# Patient Record
Sex: MALE | Race: WHITE | Employment: OTHER | RURAL
[De-identification: names, ages, dates, MRNs, and addresses within clinical notes are randomized per-mention and may not be internally consistent; named-entity substitution may affect disease eponyms.]

---

## 2017-01-04 ENCOUNTER — OFFICE VISIT (OUTPATIENT)
Dept: FAMILY MEDICINE CLINIC | Age: 67
End: 2017-01-04
Payer: MEDICARE

## 2017-01-04 VITALS — HEART RATE: 57 BPM | RESPIRATION RATE: 16 BRPM | SYSTOLIC BLOOD PRESSURE: 123 MMHG | DIASTOLIC BLOOD PRESSURE: 74 MMHG

## 2017-01-04 DIAGNOSIS — R21 RASH: ICD-10-CM

## 2017-01-04 DIAGNOSIS — K42.9 UMBILICAL HERNIA WITHOUT OBSTRUCTION AND WITHOUT GANGRENE: Primary | ICD-10-CM

## 2017-01-04 PROCEDURE — 99213 OFFICE O/P EST LOW 20 MIN: CPT | Performed by: FAMILY MEDICINE

## 2017-01-04 PROCEDURE — 96372 THER/PROPH/DIAG INJ SC/IM: CPT | Performed by: FAMILY MEDICINE

## 2017-01-04 RX ORDER — METHYLPREDNISOLONE ACETATE 80 MG/ML
120 INJECTION, SUSPENSION INTRA-ARTICULAR; INTRALESIONAL; INTRAMUSCULAR; SOFT TISSUE ONCE
Status: COMPLETED | OUTPATIENT
Start: 2017-01-04 | End: 2017-01-04

## 2017-01-04 RX ADMIN — METHYLPREDNISOLONE ACETATE 120 MG: 80 INJECTION, SUSPENSION INTRA-ARTICULAR; INTRALESIONAL; INTRAMUSCULAR; SOFT TISSUE at 15:38

## 2017-01-04 ASSESSMENT — ENCOUNTER SYMPTOMS
RESPIRATORY NEGATIVE: 1
EYES NEGATIVE: 1
GASTROINTESTINAL NEGATIVE: 1
ALLERGIC/IMMUNOLOGIC NEGATIVE: 1
TROUBLE SWALLOWING: 0

## 2017-01-04 ASSESSMENT — PATIENT HEALTH QUESTIONNAIRE - PHQ9
SUM OF ALL RESPONSES TO PHQ9 QUESTIONS 1 & 2: 0
SUM OF ALL RESPONSES TO PHQ QUESTIONS 1-9: 0
1. LITTLE INTEREST OR PLEASURE IN DOING THINGS: 0
2. FEELING DOWN, DEPRESSED OR HOPELESS: 0

## 2017-04-24 RX ORDER — METHOCARBAMOL 750 MG/1
750 TABLET, FILM COATED ORAL 4 TIMES DAILY PRN
COMMUNITY

## 2017-04-24 RX ORDER — ASPIRIN 81 MG/1
81 TABLET ORAL DAILY
COMMUNITY

## 2017-04-24 RX ORDER — TADALAFIL 10 MG/1
10 TABLET ORAL DAILY PRN
COMMUNITY

## 2017-04-24 RX ORDER — ATENOLOL 50 MG/1
50 TABLET ORAL DAILY
COMMUNITY

## 2017-04-24 RX ORDER — IBUPROFEN 400 MG/1
400 TABLET ORAL EVERY 6 HOURS PRN
COMMUNITY

## 2017-04-24 RX ORDER — HYDROCODONE BITARTRATE AND ACETAMINOPHEN 10; 325 MG/1; MG/1
1 TABLET ORAL EVERY 6 HOURS PRN
COMMUNITY

## 2017-05-08 ENCOUNTER — APPOINTMENT (OUTPATIENT)
Dept: PREADMISSION TESTING | Facility: HOSPITAL | Age: 67
End: 2017-05-08

## 2017-05-08 VITALS — HEIGHT: 76 IN | BODY MASS INDEX: 36.11 KG/M2 | WEIGHT: 296.52 LBS

## 2017-05-08 LAB
ANION GAP SERPL CALCULATED.3IONS-SCNC: 4 MMOL/L (ref 3–11)
BUN BLD-MCNC: 19 MG/DL (ref 9–23)
BUN/CREAT SERPL: 23.8 (ref 7–25)
CALCIUM SPEC-SCNC: 9.3 MG/DL (ref 8.7–10.4)
CHLORIDE SERPL-SCNC: 107 MMOL/L (ref 99–109)
CO2 SERPL-SCNC: 31 MMOL/L (ref 20–31)
CREAT BLD-MCNC: 0.8 MG/DL (ref 0.6–1.3)
DEPRECATED RDW RBC AUTO: 50 FL (ref 37–54)
ERYTHROCYTE [DISTWIDTH] IN BLOOD BY AUTOMATED COUNT: 14.2 % (ref 11.3–14.5)
GFR SERPL CREATININE-BSD FRML MDRD: 97 ML/MIN/1.73
GLUCOSE BLD-MCNC: 107 MG/DL (ref 70–100)
HCT VFR BLD AUTO: 47.2 % (ref 38.9–50.9)
HGB BLD-MCNC: 15 G/DL (ref 13.1–17.5)
MCH RBC QN AUTO: 30.2 PG (ref 27–31)
MCHC RBC AUTO-ENTMCNC: 31.8 G/DL (ref 32–36)
MCV RBC AUTO: 95 FL (ref 80–99)
PLATELET # BLD AUTO: 182 10*3/MM3 (ref 150–450)
PMV BLD AUTO: 9.7 FL (ref 6–12)
POTASSIUM BLD-SCNC: 4.2 MMOL/L (ref 3.5–5.5)
RBC # BLD AUTO: 4.97 10*6/MM3 (ref 4.2–5.76)
SODIUM BLD-SCNC: 142 MMOL/L (ref 132–146)
WBC NRBC COR # BLD: 5.31 10*3/MM3 (ref 3.5–10.8)

## 2017-05-08 PROCEDURE — 36415 COLL VENOUS BLD VENIPUNCTURE: CPT

## 2017-05-08 PROCEDURE — 85027 COMPLETE CBC AUTOMATED: CPT | Performed by: SURGERY

## 2017-05-08 PROCEDURE — 93005 ELECTROCARDIOGRAM TRACING: CPT

## 2017-05-08 PROCEDURE — 93010 ELECTROCARDIOGRAM REPORT: CPT | Performed by: INTERNAL MEDICINE

## 2017-05-08 PROCEDURE — 80048 BASIC METABOLIC PNL TOTAL CA: CPT | Performed by: SURGERY

## 2017-05-08 RX ORDER — LORATADINE 10 MG/1
10 CAPSULE, LIQUID FILLED ORAL DAILY
COMMUNITY

## 2017-05-08 RX ORDER — AMOXICILLIN 500 MG/1
1000 CAPSULE ORAL 3 TIMES DAILY
COMMUNITY
Start: 2017-05-04

## 2017-05-08 RX ORDER — TADALAFIL 10 MG/1
10 TABLET ORAL DAILY PRN
COMMUNITY

## 2017-05-08 RX ORDER — LISINOPRIL 20 MG/1
20 TABLET ORAL DAILY
COMMUNITY
End: 2021-05-07

## 2017-05-08 RX ORDER — ATENOLOL 50 MG/1
50 TABLET ORAL DAILY
COMMUNITY

## 2017-05-08 RX ORDER — ASPIRIN 81 MG/1
81 TABLET ORAL DAILY
COMMUNITY

## 2017-05-09 ENCOUNTER — ANESTHESIA EVENT (OUTPATIENT)
Dept: PERIOP | Facility: HOSPITAL | Age: 67
End: 2017-05-09

## 2017-05-09 RX ORDER — FAMOTIDINE 10 MG/ML
20 INJECTION, SOLUTION INTRAVENOUS ONCE
Status: CANCELLED | OUTPATIENT
Start: 2017-05-09 | End: 2017-05-09

## 2017-05-10 ENCOUNTER — HOSPITAL ENCOUNTER (OUTPATIENT)
Facility: HOSPITAL | Age: 67
Discharge: HOME | End: 2017-05-11
Attending: SURGERY | Admitting: SURGERY

## 2017-05-10 ENCOUNTER — ANESTHESIA (OUTPATIENT)
Dept: PERIOP | Facility: HOSPITAL | Age: 67
End: 2017-05-10

## 2017-05-10 PROBLEM — K40.90 INGUINAL HERNIA: Status: ACTIVE | Noted: 2017-05-10

## 2017-05-10 PROCEDURE — C1781 MESH (IMPLANTABLE): HCPCS | Performed by: SURGERY

## 2017-05-10 PROCEDURE — 25010000002 FENTANYL CITRATE (PF) 100 MCG/2ML SOLUTION: Performed by: NURSE ANESTHETIST, CERTIFIED REGISTERED

## 2017-05-10 PROCEDURE — 25010000003 CEFAZOLIN IN DEXTROSE 2-4 GM/100ML-% SOLUTION: Performed by: SURGERY

## 2017-05-10 PROCEDURE — 25010000002 BUPRENORPHINE PER 0.1 MG: Performed by: NURSE ANESTHETIST, CERTIFIED REGISTERED

## 2017-05-10 PROCEDURE — 25010000002 HYDROMORPHONE PER 4 MG: Performed by: NURSE ANESTHETIST, CERTIFIED REGISTERED

## 2017-05-10 PROCEDURE — G0378 HOSPITAL OBSERVATION PER HR: HCPCS

## 2017-05-10 PROCEDURE — 25010000002 PROPOFOL 10 MG/ML EMULSION: Performed by: NURSE ANESTHETIST, CERTIFIED REGISTERED

## 2017-05-10 PROCEDURE — 25010000002 MORPHINE SULFATE (PF) 2 MG/ML SOLUTION: Performed by: SURGERY

## 2017-05-10 PROCEDURE — 25010000002 DEXAMETHASONE SODIUM PHOSPHATE 10 MG/ML SOLUTION 1 ML VIAL: Performed by: NURSE ANESTHETIST, CERTIFIED REGISTERED

## 2017-05-10 PROCEDURE — 25010000002 NEOSTIGMINE 10 MG/10ML SOLUTION: Performed by: NURSE ANESTHETIST, CERTIFIED REGISTERED

## 2017-05-10 DEVICE — MESH PROLN 3X6: Type: IMPLANTABLE DEVICE | Site: GROIN | Status: FUNCTIONAL

## 2017-05-10 RX ORDER — SODIUM CHLORIDE 0.9 % (FLUSH) 0.9 %
1-10 SYRINGE (ML) INJECTION AS NEEDED
Status: DISCONTINUED | OUTPATIENT
Start: 2017-05-10 | End: 2017-05-10 | Stop reason: HOSPADM

## 2017-05-10 RX ORDER — CETIRIZINE HYDROCHLORIDE 10 MG/1
5 TABLET ORAL DAILY
Status: DISCONTINUED | OUTPATIENT
Start: 2017-05-10 | End: 2017-05-11 | Stop reason: HOSPADM

## 2017-05-10 RX ORDER — OXYCODONE HYDROCHLORIDE AND ACETAMINOPHEN 5; 325 MG/1; MG/1
1 TABLET ORAL ONCE AS NEEDED
Status: DISCONTINUED | OUTPATIENT
Start: 2017-05-10 | End: 2017-05-10 | Stop reason: HOSPADM

## 2017-05-10 RX ORDER — POLYETHYLENE GLYCOL 3350 17 G/17G
17 POWDER, FOR SOLUTION ORAL NIGHTLY
COMMUNITY

## 2017-05-10 RX ORDER — PROMETHAZINE HYDROCHLORIDE 25 MG/ML
12.5 INJECTION, SOLUTION INTRAMUSCULAR; INTRAVENOUS EVERY 6 HOURS PRN
Status: DISCONTINUED | OUTPATIENT
Start: 2017-05-10 | End: 2017-05-11 | Stop reason: HOSPADM

## 2017-05-10 RX ORDER — MULTIPLE VITAMINS W/ MINERALS TAB 9MG-400MCG
1 TAB ORAL DAILY
Status: DISCONTINUED | OUTPATIENT
Start: 2017-05-10 | End: 2017-05-11 | Stop reason: HOSPADM

## 2017-05-10 RX ORDER — ATRACURIUM BESYLATE 10 MG/ML
INJECTION, SOLUTION INTRAVENOUS AS NEEDED
Status: DISCONTINUED | OUTPATIENT
Start: 2017-05-10 | End: 2017-05-10 | Stop reason: SURG

## 2017-05-10 RX ORDER — FENTANYL CITRATE 50 UG/ML
50 INJECTION, SOLUTION INTRAMUSCULAR; INTRAVENOUS
Status: DISCONTINUED | OUTPATIENT
Start: 2017-05-10 | End: 2017-05-10 | Stop reason: HOSPADM

## 2017-05-10 RX ORDER — NEOSTIGMINE METHYLSULFATE 1 MG/ML
INJECTION, SOLUTION INTRAVENOUS AS NEEDED
Status: DISCONTINUED | OUTPATIENT
Start: 2017-05-10 | End: 2017-05-10 | Stop reason: SURG

## 2017-05-10 RX ORDER — IPRATROPIUM BROMIDE AND ALBUTEROL SULFATE 2.5; .5 MG/3ML; MG/3ML
3 SOLUTION RESPIRATORY (INHALATION) ONCE AS NEEDED
Status: DISCONTINUED | OUTPATIENT
Start: 2017-05-10 | End: 2017-05-10 | Stop reason: HOSPADM

## 2017-05-10 RX ORDER — NALOXONE HCL 0.4 MG/ML
0.4 VIAL (ML) INJECTION
Status: DISCONTINUED | OUTPATIENT
Start: 2017-05-10 | End: 2017-05-11 | Stop reason: HOSPADM

## 2017-05-10 RX ORDER — DOCUSATE SODIUM 100 MG/1
100 CAPSULE, LIQUID FILLED ORAL 2 TIMES DAILY PRN
Status: DISCONTINUED | OUTPATIENT
Start: 2017-05-10 | End: 2017-05-11 | Stop reason: HOSPADM

## 2017-05-10 RX ORDER — NALOXONE HCL 0.4 MG/ML
0.4 VIAL (ML) INJECTION AS NEEDED
Status: DISCONTINUED | OUTPATIENT
Start: 2017-05-10 | End: 2017-05-10 | Stop reason: HOSPADM

## 2017-05-10 RX ORDER — ACETAMINOPHEN 325 MG/1
650 TABLET ORAL EVERY 4 HOURS PRN
Status: DISCONTINUED | OUTPATIENT
Start: 2017-05-10 | End: 2017-05-11 | Stop reason: HOSPADM

## 2017-05-10 RX ORDER — ONDANSETRON 2 MG/ML
4 INJECTION INTRAMUSCULAR; INTRAVENOUS ONCE AS NEEDED
Status: DISCONTINUED | OUTPATIENT
Start: 2017-05-10 | End: 2017-05-10 | Stop reason: HOSPADM

## 2017-05-10 RX ORDER — HYDRALAZINE HYDROCHLORIDE 20 MG/ML
5 INJECTION INTRAMUSCULAR; INTRAVENOUS
Status: DISCONTINUED | OUTPATIENT
Start: 2017-05-10 | End: 2017-05-10 | Stop reason: HOSPADM

## 2017-05-10 RX ORDER — ACETAMINOPHEN 160 MG/5ML
650 SOLUTION ORAL EVERY 4 HOURS PRN
Status: DISCONTINUED | OUTPATIENT
Start: 2017-05-10 | End: 2017-05-11 | Stop reason: HOSPADM

## 2017-05-10 RX ORDER — PROMETHAZINE HYDROCHLORIDE 25 MG/1
25 TABLET ORAL ONCE AS NEEDED
Status: DISCONTINUED | OUTPATIENT
Start: 2017-05-10 | End: 2017-05-10 | Stop reason: HOSPADM

## 2017-05-10 RX ORDER — CEFAZOLIN SODIUM 2 G/100ML
2 INJECTION, SOLUTION INTRAVENOUS ONCE
Status: COMPLETED | OUTPATIENT
Start: 2017-05-10 | End: 2017-05-10

## 2017-05-10 RX ORDER — LIDOCAINE HYDROCHLORIDE 10 MG/ML
0.5 INJECTION, SOLUTION EPIDURAL; INFILTRATION; INTRACAUDAL; PERINEURAL ONCE AS NEEDED
Status: COMPLETED | OUTPATIENT
Start: 2017-05-10 | End: 2017-05-10

## 2017-05-10 RX ORDER — OXYCODONE AND ACETAMINOPHEN 7.5; 325 MG/1; MG/1
1 TABLET ORAL ONCE AS NEEDED
Status: DISCONTINUED | OUTPATIENT
Start: 2017-05-10 | End: 2017-05-10 | Stop reason: HOSPADM

## 2017-05-10 RX ORDER — OXYCODONE AND ACETAMINOPHEN 10; 325 MG/1; MG/1
1 TABLET ORAL EVERY 4 HOURS PRN
Status: DISCONTINUED | OUTPATIENT
Start: 2017-05-10 | End: 2017-05-11 | Stop reason: HOSPADM

## 2017-05-10 RX ORDER — NAPROXEN SODIUM 220 MG
220 TABLET ORAL AS NEEDED
COMMUNITY

## 2017-05-10 RX ORDER — PROMETHAZINE HYDROCHLORIDE 25 MG/1
25 SUPPOSITORY RECTAL ONCE AS NEEDED
Status: DISCONTINUED | OUTPATIENT
Start: 2017-05-10 | End: 2017-05-10 | Stop reason: HOSPADM

## 2017-05-10 RX ORDER — ATENOLOL 50 MG/1
50 TABLET ORAL DAILY
Status: DISCONTINUED | OUTPATIENT
Start: 2017-05-10 | End: 2017-05-11 | Stop reason: HOSPADM

## 2017-05-10 RX ORDER — ONDANSETRON 2 MG/ML
4 INJECTION INTRAMUSCULAR; INTRAVENOUS EVERY 6 HOURS PRN
Status: DISCONTINUED | OUTPATIENT
Start: 2017-05-10 | End: 2017-05-11 | Stop reason: HOSPADM

## 2017-05-10 RX ORDER — CEFAZOLIN SODIUM 2 G/100ML
2 INJECTION, SOLUTION INTRAVENOUS EVERY 8 HOURS
Status: DISCONTINUED | OUTPATIENT
Start: 2017-05-10 | End: 2017-05-10 | Stop reason: SDUPTHER

## 2017-05-10 RX ORDER — PROMETHAZINE HYDROCHLORIDE 25 MG/ML
6.25 INJECTION, SOLUTION INTRAMUSCULAR; INTRAVENOUS ONCE AS NEEDED
Status: DISCONTINUED | OUTPATIENT
Start: 2017-05-10 | End: 2017-05-10 | Stop reason: HOSPADM

## 2017-05-10 RX ORDER — PROMETHAZINE HYDROCHLORIDE 25 MG/ML
12.5 INJECTION, SOLUTION INTRAMUSCULAR; INTRAVENOUS EVERY 4 HOURS PRN
Status: DISCONTINUED | OUTPATIENT
Start: 2017-05-10 | End: 2017-05-11 | Stop reason: HOSPADM

## 2017-05-10 RX ORDER — LISINOPRIL 20 MG/1
20 TABLET ORAL DAILY
Status: DISCONTINUED | OUTPATIENT
Start: 2017-05-11 | End: 2017-05-11 | Stop reason: HOSPADM

## 2017-05-10 RX ORDER — FAMOTIDINE 20 MG/1
20 TABLET, FILM COATED ORAL ONCE
Status: COMPLETED | OUTPATIENT
Start: 2017-05-10 | End: 2017-05-10

## 2017-05-10 RX ORDER — PROPOFOL 10 MG/ML
VIAL (ML) INTRAVENOUS AS NEEDED
Status: DISCONTINUED | OUTPATIENT
Start: 2017-05-10 | End: 2017-05-10 | Stop reason: SURG

## 2017-05-10 RX ORDER — POLYETHYLENE GLYCOL 3350 17 G/17G
17 POWDER, FOR SOLUTION ORAL NIGHTLY
Status: DISCONTINUED | OUTPATIENT
Start: 2017-05-10 | End: 2017-05-11 | Stop reason: HOSPADM

## 2017-05-10 RX ORDER — SODIUM CHLORIDE, SODIUM LACTATE, POTASSIUM CHLORIDE, CALCIUM CHLORIDE 600; 310; 30; 20 MG/100ML; MG/100ML; MG/100ML; MG/100ML
9 INJECTION, SOLUTION INTRAVENOUS CONTINUOUS
Status: DISCONTINUED | OUTPATIENT
Start: 2017-05-10 | End: 2017-05-11 | Stop reason: HOSPADM

## 2017-05-10 RX ORDER — LIDOCAINE HYDROCHLORIDE 10 MG/ML
INJECTION, SOLUTION EPIDURAL; INFILTRATION; INTRACAUDAL; PERINEURAL AS NEEDED
Status: DISCONTINUED | OUTPATIENT
Start: 2017-05-10 | End: 2017-05-10 | Stop reason: SURG

## 2017-05-10 RX ORDER — LABETALOL HYDROCHLORIDE 5 MG/ML
5 INJECTION, SOLUTION INTRAVENOUS
Status: DISCONTINUED | OUTPATIENT
Start: 2017-05-10 | End: 2017-05-10 | Stop reason: HOSPADM

## 2017-05-10 RX ORDER — ASPIRIN 81 MG/1
81 TABLET ORAL DAILY
Status: DISCONTINUED | OUTPATIENT
Start: 2017-05-10 | End: 2017-05-11 | Stop reason: HOSPADM

## 2017-05-10 RX ORDER — ONDANSETRON 4 MG/1
4 TABLET, FILM COATED ORAL EVERY 6 HOURS PRN
Status: DISCONTINUED | OUTPATIENT
Start: 2017-05-10 | End: 2017-05-11 | Stop reason: HOSPADM

## 2017-05-10 RX ORDER — CEFAZOLIN SODIUM IN 0.9 % NACL 3 G/100 ML
3 INTRAVENOUS SOLUTION, PIGGYBACK (ML) INTRAVENOUS EVERY 8 HOURS
Status: COMPLETED | OUTPATIENT
Start: 2017-05-10 | End: 2017-05-11

## 2017-05-10 RX ORDER — SODIUM CHLORIDE, SODIUM LACTATE, POTASSIUM CHLORIDE, CALCIUM CHLORIDE 600; 310; 30; 20 MG/100ML; MG/100ML; MG/100ML; MG/100ML
50 INJECTION, SOLUTION INTRAVENOUS CONTINUOUS
Status: DISCONTINUED | OUTPATIENT
Start: 2017-05-10 | End: 2017-05-11 | Stop reason: HOSPADM

## 2017-05-10 RX ORDER — MORPHINE SULFATE 2 MG/ML
6 INJECTION, SOLUTION INTRAMUSCULAR; INTRAVENOUS
Status: DISCONTINUED | OUTPATIENT
Start: 2017-05-10 | End: 2017-05-11 | Stop reason: HOSPADM

## 2017-05-10 RX ORDER — HYDROMORPHONE HYDROCHLORIDE 1 MG/ML
0.5 INJECTION, SOLUTION INTRAMUSCULAR; INTRAVENOUS; SUBCUTANEOUS
Status: DISCONTINUED | OUTPATIENT
Start: 2017-05-10 | End: 2017-05-10 | Stop reason: HOSPADM

## 2017-05-10 RX ORDER — GLYCOPYRROLATE 0.2 MG/ML
INJECTION INTRAMUSCULAR; INTRAVENOUS AS NEEDED
Status: DISCONTINUED | OUTPATIENT
Start: 2017-05-10 | End: 2017-05-10 | Stop reason: SURG

## 2017-05-10 RX ORDER — ACETAMINOPHEN 650 MG/1
650 SUPPOSITORY RECTAL EVERY 4 HOURS PRN
Status: DISCONTINUED | OUTPATIENT
Start: 2017-05-10 | End: 2017-05-11 | Stop reason: HOSPADM

## 2017-05-10 RX ADMIN — LIDOCAINE HYDROCHLORIDE 0.5 ML: 10 INJECTION, SOLUTION EPIDURAL; INFILTRATION; INTRACAUDAL; PERINEURAL at 07:10

## 2017-05-10 RX ADMIN — SODIUM CHLORIDE, POTASSIUM CHLORIDE, SODIUM LACTATE AND CALCIUM CHLORIDE: 600; 310; 30; 20 INJECTION, SOLUTION INTRAVENOUS at 10:33

## 2017-05-10 RX ADMIN — FAMOTIDINE 20 MG: 20 TABLET, FILM COATED ORAL at 07:17

## 2017-05-10 RX ADMIN — HYDROMORPHONE HYDROCHLORIDE 0.5 MG: 1 INJECTION, SOLUTION INTRAMUSCULAR; INTRAVENOUS; SUBCUTANEOUS at 12:05

## 2017-05-10 RX ADMIN — HYDROMORPHONE HYDROCHLORIDE 0.5 MG: 1 INJECTION, SOLUTION INTRAMUSCULAR; INTRAVENOUS; SUBCUTANEOUS at 11:25

## 2017-05-10 RX ADMIN — ROBINUL 0.4 MG: 0.2 INJECTION INTRAMUSCULAR; INTRAVENOUS at 10:27

## 2017-05-10 RX ADMIN — SODIUM CHLORIDE, POTASSIUM CHLORIDE, SODIUM LACTATE AND CALCIUM CHLORIDE 50 ML/HR: 600; 310; 30; 20 INJECTION, SOLUTION INTRAVENOUS at 13:09

## 2017-05-10 RX ADMIN — OXYCODONE HYDROCHLORIDE AND ACETAMINOPHEN 1 TABLET: 10; 325 TABLET ORAL at 16:47

## 2017-05-10 RX ADMIN — MORPHINE SULFATE 2 MG: 2 INJECTION, SOLUTION INTRAMUSCULAR; INTRAVENOUS at 13:53

## 2017-05-10 RX ADMIN — LIDOCAINE HYDROCHLORIDE 50 MG: 10 INJECTION, SOLUTION EPIDURAL; INFILTRATION; INTRACAUDAL; PERINEURAL at 08:02

## 2017-05-10 RX ADMIN — CETIRIZINE HYDROCHLORIDE 5 MG: 10 TABLET, FILM COATED ORAL at 21:15

## 2017-05-10 RX ADMIN — ATRACURIUM BESYLATE 50 MG: 10 INJECTION, SOLUTION INTRAVENOUS at 08:02

## 2017-05-10 RX ADMIN — CEFAZOLIN SODIUM 2 G: 2 INJECTION, SOLUTION INTRAVENOUS at 08:05

## 2017-05-10 RX ADMIN — DEXAMETHASONE SODIUM PHOSPHATE: 10 INJECTION, SOLUTION INTRAMUSCULAR; INTRAVENOUS at 08:04

## 2017-05-10 RX ADMIN — MORPHINE SULFATE 4 MG: 2 INJECTION, SOLUTION INTRAMUSCULAR; INTRAVENOUS at 12:59

## 2017-05-10 RX ADMIN — FENTANYL CITRATE 50 MCG: 50 INJECTION INTRAMUSCULAR; INTRAVENOUS at 11:00

## 2017-05-10 RX ADMIN — OXYCODONE HYDROCHLORIDE AND ACETAMINOPHEN 1 TABLET: 10; 325 TABLET ORAL at 21:16

## 2017-05-10 RX ADMIN — FENTANYL CITRATE 50 MCG: 50 INJECTION INTRAMUSCULAR; INTRAVENOUS at 11:10

## 2017-05-10 RX ADMIN — PROPOFOL 200 MG: 10 INJECTION, EMULSION INTRAVENOUS at 08:02

## 2017-05-10 RX ADMIN — POLYETHYLENE GLYCOL 3350 17 G: 17 POWDER, FOR SOLUTION ORAL at 21:17

## 2017-05-10 RX ADMIN — OXYCODONE HYDROCHLORIDE AND ACETAMINOPHEN 1 TABLET: 10; 325 TABLET ORAL at 13:00

## 2017-05-10 RX ADMIN — NEOSTIGMINE METHYLSULFATE 2.5 MG: 1 INJECTION, SOLUTION INTRAVENOUS at 10:27

## 2017-05-10 RX ADMIN — HYDROMORPHONE HYDROCHLORIDE 0.5 MG: 1 INJECTION, SOLUTION INTRAMUSCULAR; INTRAVENOUS; SUBCUTANEOUS at 11:40

## 2017-05-10 RX ADMIN — Medication 3 G: at 16:45

## 2017-05-10 RX ADMIN — SODIUM CHLORIDE, POTASSIUM CHLORIDE, SODIUM LACTATE AND CALCIUM CHLORIDE 9 ML/HR: 600; 310; 30; 20 INJECTION, SOLUTION INTRAVENOUS at 07:10

## 2017-05-11 VITALS
OXYGEN SATURATION: 97 % | HEIGHT: 76 IN | DIASTOLIC BLOOD PRESSURE: 64 MMHG | SYSTOLIC BLOOD PRESSURE: 111 MMHG | WEIGHT: 296 LBS | HEART RATE: 50 BPM | TEMPERATURE: 97.6 F | RESPIRATION RATE: 18 BRPM | BODY MASS INDEX: 36.04 KG/M2

## 2017-05-11 PROCEDURE — G0378 HOSPITAL OBSERVATION PER HR: HCPCS

## 2017-05-11 RX ORDER — OXYCODONE AND ACETAMINOPHEN 10; 325 MG/1; MG/1
1 TABLET ORAL EVERY 4 HOURS PRN
Qty: 40 TABLET | Refills: 0 | Status: SHIPPED | OUTPATIENT
Start: 2017-05-11 | End: 2017-05-20

## 2017-05-11 RX ADMIN — MULTIPLE VITAMINS W/ MINERALS TAB 1 TABLET: TAB ORAL at 08:16

## 2017-05-11 RX ADMIN — Medication 3 G: at 00:33

## 2017-05-11 RX ADMIN — CETIRIZINE HYDROCHLORIDE 5 MG: 10 TABLET, FILM COATED ORAL at 08:17

## 2017-05-11 RX ADMIN — OXYCODONE HYDROCHLORIDE AND ACETAMINOPHEN 1 TABLET: 10; 325 TABLET ORAL at 01:03

## 2017-05-11 RX ADMIN — ASPIRIN 81 MG: 81 TABLET, COATED ORAL at 08:16

## 2017-05-11 RX ADMIN — OXYCODONE HYDROCHLORIDE AND ACETAMINOPHEN 1 TABLET: 10; 325 TABLET ORAL at 08:30

## 2017-05-11 RX ADMIN — HYDROCORTISONE 2.5% 1 APPLICATION: 25 CREAM TOPICAL at 08:15

## 2017-05-11 RX ADMIN — LISINOPRIL 20 MG: 20 TABLET ORAL at 08:20

## 2017-05-22 ENCOUNTER — OFFICE VISIT (OUTPATIENT)
Dept: FAMILY MEDICINE CLINIC | Age: 67
End: 2017-05-22
Payer: MEDICARE

## 2017-05-22 VITALS
HEART RATE: 57 BPM | RESPIRATION RATE: 18 BRPM | SYSTOLIC BLOOD PRESSURE: 142 MMHG | OXYGEN SATURATION: 96 % | DIASTOLIC BLOOD PRESSURE: 89 MMHG

## 2017-05-22 DIAGNOSIS — Z98.890 S/P HERNIA REPAIR: Primary | ICD-10-CM

## 2017-05-22 DIAGNOSIS — Z87.19 S/P HERNIA REPAIR: Primary | ICD-10-CM

## 2017-05-22 PROCEDURE — 1036F TOBACCO NON-USER: CPT | Performed by: NURSE PRACTITIONER

## 2017-05-22 PROCEDURE — 99213 OFFICE O/P EST LOW 20 MIN: CPT | Performed by: NURSE PRACTITIONER

## 2017-05-22 PROCEDURE — G8421 BMI NOT CALCULATED: HCPCS | Performed by: NURSE PRACTITIONER

## 2017-05-22 PROCEDURE — 3017F COLORECTAL CA SCREEN DOC REV: CPT | Performed by: NURSE PRACTITIONER

## 2017-05-22 PROCEDURE — 1123F ACP DISCUSS/DSCN MKR DOCD: CPT | Performed by: NURSE PRACTITIONER

## 2017-05-22 PROCEDURE — G8427 DOCREV CUR MEDS BY ELIG CLIN: HCPCS | Performed by: NURSE PRACTITIONER

## 2017-05-22 PROCEDURE — 4040F PNEUMOC VAC/ADMIN/RCVD: CPT | Performed by: NURSE PRACTITIONER

## 2017-05-22 ASSESSMENT — ENCOUNTER SYMPTOMS
CONSTIPATION: 0
EYES NEGATIVE: 1
ALLERGIC/IMMUNOLOGIC NEGATIVE: 1
DIARRHEA: 0
NAUSEA: 0
ABDOMINAL PAIN: 0
VOMITING: 0
RESPIRATORY NEGATIVE: 1

## 2018-08-20 ENCOUNTER — ANESTHESIA EVENT (OUTPATIENT)
Dept: ENDOSCOPY | Facility: HOSPITAL | Age: 68
End: 2018-08-20
Payer: MEDICARE

## 2018-08-20 ENCOUNTER — HOSPITAL ENCOUNTER (OUTPATIENT)
Facility: HOSPITAL | Age: 68
Setting detail: OUTPATIENT SURGERY
Discharge: HOME | End: 2018-08-20
Attending: SURGERY | Admitting: SURGERY
Payer: MEDICARE

## 2018-08-20 ENCOUNTER — ANESTHESIA (OUTPATIENT)
Dept: ENDOSCOPY | Facility: HOSPITAL | Age: 68
End: 2018-08-20
Payer: MEDICARE

## 2018-08-20 VITALS
WEIGHT: 288 LBS | HEART RATE: 48 BPM | OXYGEN SATURATION: 95 % | DIASTOLIC BLOOD PRESSURE: 72 MMHG | HEIGHT: 76 IN | TEMPERATURE: 97.7 F | BODY MASS INDEX: 35.07 KG/M2 | SYSTOLIC BLOOD PRESSURE: 119 MMHG | RESPIRATION RATE: 22 BRPM

## 2018-08-20 VITALS
SYSTOLIC BLOOD PRESSURE: 110 MMHG | RESPIRATION RATE: 29 BRPM | DIASTOLIC BLOOD PRESSURE: 78 MMHG | OXYGEN SATURATION: 93 %

## 2018-08-20 PROCEDURE — 3700000001 HC ADD 15 MINUTES (ANESTHESIA): Performed by: SURGERY

## 2018-08-20 PROCEDURE — 2580000003 HC RX 258: Performed by: SURGERY

## 2018-08-20 PROCEDURE — G0121 COLON CA SCRN NOT HI RSK IND: HCPCS | Performed by: SURGERY

## 2018-08-20 PROCEDURE — 3609009500 HC COLONOSCOPY DIAGNOSTIC OR SCREENING: Performed by: SURGERY

## 2018-08-20 PROCEDURE — 7100000011 HC PHASE II RECOVERY - ADDTL 15 MIN: Performed by: SURGERY

## 2018-08-20 PROCEDURE — 6360000002 HC RX W HCPCS: Performed by: NURSE ANESTHETIST, CERTIFIED REGISTERED

## 2018-08-20 PROCEDURE — 3700000000 HC ANESTHESIA ATTENDED CARE: Performed by: SURGERY

## 2018-08-20 PROCEDURE — 7100000010 HC PHASE II RECOVERY - FIRST 15 MIN: Performed by: SURGERY

## 2018-08-20 RX ORDER — VARDENAFIL HYDROCHLORIDE 10 MG/1
10 TABLET ORAL DAILY
COMMUNITY

## 2018-08-20 RX ORDER — LOSARTAN POTASSIUM 50 MG/1
50 TABLET ORAL DAILY
COMMUNITY

## 2018-08-20 RX ORDER — SODIUM CHLORIDE 0.9 % (FLUSH) 0.9 %
10 SYRINGE (ML) INJECTION EVERY 12 HOURS SCHEDULED
Status: DISCONTINUED | OUTPATIENT
Start: 2018-08-20 | End: 2018-08-20 | Stop reason: HOSPADM

## 2018-08-20 RX ORDER — SODIUM CHLORIDE, SODIUM LACTATE, POTASSIUM CHLORIDE, CALCIUM CHLORIDE 600; 310; 30; 20 MG/100ML; MG/100ML; MG/100ML; MG/100ML
INJECTION, SOLUTION INTRAVENOUS CONTINUOUS
Status: DISCONTINUED | OUTPATIENT
Start: 2018-08-20 | End: 2018-08-20 | Stop reason: HOSPADM

## 2018-08-20 RX ORDER — M-VIT,TX,IRON,MINS/CALC/FOLIC 27MG-0.4MG
1 TABLET ORAL DAILY
COMMUNITY

## 2018-08-20 RX ORDER — PROPOFOL 10 MG/ML
INJECTION, EMULSION INTRAVENOUS PRN
Status: DISCONTINUED | OUTPATIENT
Start: 2018-08-20 | End: 2018-08-20 | Stop reason: SDUPTHER

## 2018-08-20 RX ORDER — POLYETHYLENE GLYCOL 3350 17 G/17G
17 POWDER, FOR SOLUTION ORAL DAILY
COMMUNITY

## 2018-08-20 RX ORDER — SODIUM CHLORIDE 0.9 % (FLUSH) 0.9 %
10 SYRINGE (ML) INJECTION PRN
Status: DISCONTINUED | OUTPATIENT
Start: 2018-08-20 | End: 2018-08-20 | Stop reason: HOSPADM

## 2018-08-20 RX ADMIN — Medication 10 ML: at 11:15

## 2018-08-20 RX ADMIN — PROPOFOL 90 MG: 10 INJECTION, EMULSION INTRAVENOUS at 12:33

## 2018-08-20 RX ADMIN — PROPOFOL 40 MG: 10 INJECTION, EMULSION INTRAVENOUS at 12:48

## 2018-08-20 RX ADMIN — PROPOFOL 80 MG: 10 INJECTION, EMULSION INTRAVENOUS at 12:52

## 2018-08-20 RX ADMIN — PROPOFOL 70 MG: 10 INJECTION, EMULSION INTRAVENOUS at 12:39

## 2018-08-20 RX ADMIN — PROPOFOL 30 MG: 10 INJECTION, EMULSION INTRAVENOUS at 12:35

## 2018-08-20 RX ADMIN — SODIUM CHLORIDE, POTASSIUM CHLORIDE, SODIUM LACTATE AND CALCIUM CHLORIDE: 600; 310; 30; 20 INJECTION, SOLUTION INTRAVENOUS at 11:14

## 2018-08-20 RX ADMIN — PROPOFOL 50 MG: 10 INJECTION, EMULSION INTRAVENOUS at 12:42

## 2018-08-20 ASSESSMENT — ENCOUNTER SYMPTOMS
RESPIRATORY NEGATIVE: 1
EYES NEGATIVE: 1
GASTROINTESTINAL NEGATIVE: 1

## 2018-08-20 NOTE — ANESTHESIA POSTPROCEDURE EVALUATION
Department of Anesthesiology  Postprocedure Note    Patient: Madeline Yusuf  MRN: [id number]  YOB: 1950  Date of evaluation: 8/20/2018  Time:  1:03 PM     Procedure Summary     Date:  08/20/18 Room / Location:  [place] [address] / [facility] ENDOSCOPY    Anesthesia Start:  2050 Anesthesia Stop:  9788    Procedure:  COLONOSCOPY DIAGNOSTIC OR SCREENING (N/A ) Diagnosis:  (Z12.11)    Surgeon:  Ever Stephen MD Responsible Provider:  SOFI Gallego CRNA    Anesthesia Type:  MAC ASA Status:  3          Anesthesia Type: MAC    Imani Phase I: Imani Score: 10    Imani Phase II: Imani Score: 10    Last vitals: Reviewed and per EMR flowsheets.        Anesthesia Post Evaluation    Patient location during evaluation: bedside  Patient participation: complete - patient participated  Level of consciousness: awake and alert  Airway patency: patent  Nausea & Vomiting: no nausea and no vomiting  Complications: no  Cardiovascular status: blood pressure returned to baseline  Respiratory status: acceptable  Hydration status: stable

## 2018-08-20 NOTE — ANESTHESIA PRE PROCEDURE
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    Left shoulder pain M25.512    Right leg pain M79.604    Hernia K46.9    Hemorrhoids K64.9    Pre-op evaluation Z01.818    Neck pain M54.2    Rash R21       Past Medical History:        Diagnosis Date    Asthma     Hypertension     Seasonal allergies        Past Surgical History:        Procedure Laterality Date    BACK SURGERY      HERNIA REPAIR      SHOULDER SURGERY      TONSILLECTOMY AND ADENOIDECTOMY      TOTAL KNEE ARTHROPLASTY Bilateral     TUMOR REMOVAL      WRIST SURGERY         Social History:    Social History   Substance Use Topics    Smoking status: Never Smoker    Smokeless tobacco: Never Used    Alcohol use No                                Counseling given: Not Answered      Vital Signs (Current):   Vitals:    08/20/18 0936 08/20/18 1114   BP: 131/76    Pulse: 55    Resp: 22    Temp: 98.1 °F (36.7 °C)    TempSrc: Oral    SpO2: 94%    Weight:  288 lb (130.6 kg)   Height:  6' 4\" (1.93 m)                                              BP Readings from Last 3 Encounters:   08/20/18 131/76   05/22/17 (!) 142/89   01/04/17 123/74       NPO Status: Time of last liquid consumption: 2330                        Time of last solid consumption: 2000                        Date of last liquid consumption: 08/19/18                        Date of last solid food consumption: 08/18/18    BMI:   Wt Readings from Last 3 Encounters:   08/20/18 288 lb (130.6 kg)   09/22/16 280 lb (127 kg)   05/10/16 271 lb (122.9 kg)     Body mass index is 35.06 kg/m².     CBC:   Lab Results   Component Value Date    WBC 5.0 09/22/2016    RBC 5.22 09/22/2016    HGB 15.1 09/22/2016    HCT 47.6 09/22/2016    MCV 91.2 09/22/2016 RDW 15.1 09/22/2016     09/22/2016       CMP:   Lab Results   Component Value Date     09/22/2016    K 5.3 09/22/2016     09/22/2016    CO2 29 09/22/2016    BUN 20 09/22/2016    CREATININE 1.1 09/22/2016    GFRAA >59 09/22/2016    AGRATIO 1.7 09/22/2016    LABGLOM >59 09/22/2016    GLUCOSE 108 09/22/2016    PROT 6.7 09/22/2016    CALCIUM 9.2 09/22/2016    BILITOT 0.5 09/22/2016    ALKPHOS 84 09/22/2016    AST 30 09/22/2016    ALT 23 09/22/2016       POC Tests: No results for input(s): POCGLU, POCNA, POCK, POCCL, POCBUN, POCHEMO, POCHCT in the last 72 hours.     Coags:   Lab Results   Component Value Date    PROTIME 10.3 08/18/2015    INR 1.00 08/18/2015    APTT 25.0 08/18/2015       HCG (If Applicable): No results found for: PREGTESTUR, PREGSERUM, HCG, HCGQUANT     ABGs: No results found for: PHART, PO2ART, PGE9EQX, AEA7HVL, BEART, Z8YDWGFZ     Type & Screen (If Applicable):  No results found for: Gracie Nino [address]    Anesthesia Evaluation     Anesthesia Plan        Kayla Altamirano APRN - CRNA   8/20/2018

## 2018-08-20 NOTE — PROGRESS NOTES
Pt awake and talking with wife. No acute distress noted. Pt tolerating po fluids well. Bowel sounds (+) x 4 quads. ABD soft and non-distended.

## 2018-08-20 NOTE — ANESTHESIA PRE PROCEDURE
RDW 15.1 09/22/2016     09/22/2016       CMP:   Lab Results   Component Value Date     09/22/2016    K 5.3 09/22/2016     09/22/2016    CO2 29 09/22/2016    BUN 20 09/22/2016    CREATININE 1.1 09/22/2016    GFRAA >59 09/22/2016    AGRATIO 1.7 09/22/2016    LABGLOM >59 09/22/2016    GLUCOSE 108 09/22/2016    PROT 6.7 09/22/2016    CALCIUM 9.2 09/22/2016    BILITOT 0.5 09/22/2016    ALKPHOS 84 09/22/2016    AST 30 09/22/2016    ALT 23 09/22/2016       POC Tests: No results for input(s): POCGLU, POCNA, POCK, POCCL, POCBUN, POCHEMO, POCHCT in the last 72 hours. Coags:   Lab Results   Component Value Date    PROTIME 10.3 08/18/2015    INR 1.00 08/18/2015    APTT 25.0 08/18/2015       HCG (If Applicable): No results found for: PREGTESTUR, PREGSERUM, HCG, HCGQUANT     ABGs: No results found for: PHART, PO2ART, ATT4JNU, ZGC8YES, BEART, P4VHPNWT     Type & Screen (If Applicable):  No results found for: LABABO, LABRH    Anesthesia Evaluation    Airway: Mallampati: II        Dental:          Pulmonary:normal exam  breath sounds clear to auscultation  (+) asthma: exercise-induced asthma,                            Cardiovascular:  Exercise tolerance: good (>4 METS),   (+) hypertension: moderate, hyperlipidemia        Rhythm: regular  Rate: normal                    Neuro/Psych:   Negative Neuro/Psych ROS              GI/Hepatic/Renal: Neg GI/Hepatic/Renal ROS            Endo/Other: Negative Endo/Other ROS                    Abdominal:           Vascular: negative vascular ROS. Anesthesia Plan      MAC     ASA 3       Induction: intravenous. Anesthetic plan and risks discussed with patient. Use of blood products discussed with patient whom.                    SOFI Ramos - CRNA   8/20/2018

## 2018-08-20 NOTE — H&P
Consult Note    Patient:   Denver Massing   YOB: 1950   Facility:   [place] [address]   Referring/PCP: SOFI Bourgeois NP  Date:     8/20/2018  Consultant:   Alejo Philippe MD  The patient is referred by SOFI Bourgeois NP to have colon screening. Subjective: This 79 y.o. male was seen  for colon screening. Patient has Last colonoscopy was over 8 years ago  Information was obtained from interview of  the patient, examination of the patient, and review of records. I did  update the past medical, surgical, social and / or family history. Patient has had no symptoms. Medications:     Prior to Admission medications    Medication Sig Start Date End Date Taking? Authorizing Provider   hydrocortisone (ANUSOL-HC) 2.5 % rectal cream Place rectally 2 times daily. 3/3/16   SOFI Bourgeois - NP   lisinopril (PRINIVIL;ZESTRIL) 20 MG tablet Take 1 tablet by mouth daily. 9/18/14   SOFI Bourgeois - NP   atenolol (TENORMIN) 50 MG tablet Take 1 tablet by mouth daily. 9/18/14   Rossy Pedraza APRROBSON - LORETTA   traMADol (ULTRAM) 50 MG tablet Take 1 tablet by mouth every 6 hours. 9/18/14   Rossy Pedraza APRROBSON - NP   cephALEXin (KEFLEX) 500 MG capsule Take 1 capsule by mouth 4 times daily. 9/18/14   Rossy Pedraza APRROBSON - NP   loratadine (CLARITIN) 10 MG tablet Take 1 tablet by mouth daily. 9/18/14   Rossy Pedraza APRROBSON - LORETTA   HYDROcodone-acetaminophen (LORTAB) 7.5-500 MG per tablet Take 1 tablet by mouth every 6 hours as needed. Historical Provider, MD   ibuprofen (ADVIL;MOTRIN) 200 MG tablet Take 200 mg by mouth every 6 hours as needed.       Historical Provider, MD        Allergies: No Known Allergies      History:     Past Medical History:   Diagnosis Date    Hypertension      Past Surgical History:   Procedure Laterality Date    BACK SURGERY      HERNIA REPAIR      SHOULDER SURGERY      TONSILLECTOMY AND ADENOIDECTOMY      TUMOR REMOVAL      PCP; Thank you for allowing me to participate in this patient's care.     Electronically signed by Wenceslao Shaffer MD on 8/20/2018 at 10:57 AM

## 2021-05-03 DIAGNOSIS — Z01.812 BLOOD TESTS PRIOR TO TREATMENT OR PROCEDURE: Primary | ICD-10-CM

## 2021-05-04 ENCOUNTER — CLINICAL SUPPORT NO REQUIREMENTS (OUTPATIENT)
Dept: PULMONOLOGY | Facility: CLINIC | Age: 71
End: 2021-05-04

## 2021-05-04 DIAGNOSIS — Z01.812 BLOOD TESTS PRIOR TO TREATMENT OR PROCEDURE: ICD-10-CM

## 2021-05-04 PROCEDURE — 99211 OFF/OP EST MAY X REQ PHY/QHP: CPT | Performed by: INTERNAL MEDICINE

## 2021-05-04 PROCEDURE — U0005 INFEC AGEN DETEC AMPLI PROBE: HCPCS | Performed by: INTERNAL MEDICINE

## 2021-05-04 PROCEDURE — U0004 COV-19 TEST NON-CDC HGH THRU: HCPCS | Performed by: INTERNAL MEDICINE

## 2021-05-05 LAB — SARS-COV-2 RNA NOSE QL NAA+PROBE: NOT DETECTED

## 2021-05-07 ENCOUNTER — OFFICE VISIT (OUTPATIENT)
Dept: PULMONOLOGY | Facility: CLINIC | Age: 71
End: 2021-05-07

## 2021-05-07 VITALS
TEMPERATURE: 98.4 F | WEIGHT: 304 LBS | BODY MASS INDEX: 37.02 KG/M2 | HEART RATE: 62 BPM | SYSTOLIC BLOOD PRESSURE: 132 MMHG | DIASTOLIC BLOOD PRESSURE: 80 MMHG | OXYGEN SATURATION: 93 % | HEIGHT: 76 IN

## 2021-05-07 DIAGNOSIS — J44.9 CHRONIC OBSTRUCTIVE PULMONARY DISEASE, UNSPECIFIED COPD TYPE (HCC): Primary | ICD-10-CM

## 2021-05-07 DIAGNOSIS — R13.19 ESOPHAGEAL DYSPHAGIA: ICD-10-CM

## 2021-05-07 DIAGNOSIS — R06.09 DOE (DYSPNEA ON EXERTION): ICD-10-CM

## 2021-05-07 DIAGNOSIS — R06.2 WHEEZING: ICD-10-CM

## 2021-05-07 PROBLEM — K40.90 INGUINAL HERNIA: Status: RESOLVED | Noted: 2017-05-10 | Resolved: 2021-05-07

## 2021-05-07 LAB
ANION GAP SERPL CALCULATED.3IONS-SCNC: 11.6 MMOL/L (ref 5–15)
BASOPHILS # BLD AUTO: 0.04 10*3/MM3 (ref 0–0.2)
BASOPHILS NFR BLD AUTO: 0.6 % (ref 0–1.5)
BUN SERPL-MCNC: 12 MG/DL (ref 8–23)
BUN/CREAT SERPL: 13.5 (ref 7–25)
CALCIUM SPEC-SCNC: 9 MG/DL (ref 8.6–10.5)
CHLORIDE SERPL-SCNC: 105 MMOL/L (ref 98–107)
CO2 SERPL-SCNC: 25.4 MMOL/L (ref 22–29)
CREAT SERPL-MCNC: 0.89 MG/DL (ref 0.76–1.27)
DEPRECATED RDW RBC AUTO: 41.4 FL (ref 37–54)
EOSINOPHIL # BLD AUTO: 0.17 10*3/MM3 (ref 0–0.4)
EOSINOPHIL NFR BLD AUTO: 2.7 % (ref 0.3–6.2)
ERYTHROCYTE [DISTWIDTH] IN BLOOD BY AUTOMATED COUNT: 13 % (ref 12.3–15.4)
ERYTHROCYTE [SEDIMENTATION RATE] IN BLOOD: 15 MM/HR (ref 0–20)
GFR SERPL CREATININE-BSD FRML MDRD: 85 ML/MIN/1.73
GLUCOSE SERPL-MCNC: 115 MG/DL (ref 65–99)
HCT VFR BLD AUTO: 48.1 % (ref 37.5–51)
HGB BLD-MCNC: 16.5 G/DL (ref 13–17.7)
IMM GRANULOCYTES # BLD AUTO: 0.02 10*3/MM3 (ref 0–0.05)
IMM GRANULOCYTES NFR BLD AUTO: 0.3 % (ref 0–0.5)
LYMPHOCYTES # BLD AUTO: 1.19 10*3/MM3 (ref 0.7–3.1)
LYMPHOCYTES NFR BLD AUTO: 18.6 % (ref 19.6–45.3)
MCH RBC QN AUTO: 29.9 PG (ref 26.6–33)
MCHC RBC AUTO-ENTMCNC: 34.3 G/DL (ref 31.5–35.7)
MCV RBC AUTO: 87.3 FL (ref 79–97)
MONOCYTES # BLD AUTO: 0.67 10*3/MM3 (ref 0.1–0.9)
MONOCYTES NFR BLD AUTO: 10.5 % (ref 5–12)
NEUTROPHILS NFR BLD AUTO: 4.31 10*3/MM3 (ref 1.7–7)
NEUTROPHILS NFR BLD AUTO: 67.3 % (ref 42.7–76)
NRBC BLD AUTO-RTO: 0 /100 WBC (ref 0–0.2)
PLATELET # BLD AUTO: 203 10*3/MM3 (ref 140–450)
PMV BLD AUTO: 10.3 FL (ref 6–12)
POTASSIUM SERPL-SCNC: 4.6 MMOL/L (ref 3.5–5.2)
RBC # BLD AUTO: 5.51 10*6/MM3 (ref 4.14–5.8)
SODIUM SERPL-SCNC: 142 MMOL/L (ref 136–145)
WBC # BLD AUTO: 6.4 10*3/MM3 (ref 3.4–10.8)

## 2021-05-07 PROCEDURE — 86003 ALLG SPEC IGE CRUDE XTRC EA: CPT | Performed by: INTERNAL MEDICINE

## 2021-05-07 PROCEDURE — 99204 OFFICE O/P NEW MOD 45 MIN: CPT | Performed by: INTERNAL MEDICINE

## 2021-05-07 PROCEDURE — 86038 ANTINUCLEAR ANTIBODIES: CPT | Performed by: INTERNAL MEDICINE

## 2021-05-07 PROCEDURE — 85652 RBC SED RATE AUTOMATED: CPT | Performed by: INTERNAL MEDICINE

## 2021-05-07 PROCEDURE — 36415 COLL VENOUS BLD VENIPUNCTURE: CPT | Performed by: INTERNAL MEDICINE

## 2021-05-07 PROCEDURE — 94726 PLETHYSMOGRAPHY LUNG VOLUMES: CPT | Performed by: INTERNAL MEDICINE

## 2021-05-07 PROCEDURE — 80048 BASIC METABOLIC PNL TOTAL CA: CPT | Performed by: INTERNAL MEDICINE

## 2021-05-07 PROCEDURE — 94375 RESPIRATORY FLOW VOLUME LOOP: CPT | Performed by: INTERNAL MEDICINE

## 2021-05-07 PROCEDURE — 85025 COMPLETE CBC W/AUTO DIFF WBC: CPT | Performed by: INTERNAL MEDICINE

## 2021-05-07 PROCEDURE — 94729 DIFFUSING CAPACITY: CPT | Performed by: INTERNAL MEDICINE

## 2021-05-07 PROCEDURE — 86431 RHEUMATOID FACTOR QUANT: CPT | Performed by: INTERNAL MEDICINE

## 2021-05-07 PROCEDURE — 94618 PULMONARY STRESS TESTING: CPT | Performed by: INTERNAL MEDICINE

## 2021-05-07 RX ORDER — AMLODIPINE BESYLATE 5 MG/1
5 TABLET ORAL DAILY
COMMUNITY
Start: 2021-04-13

## 2021-05-07 RX ORDER — ALBUTEROL SULFATE 90 UG/1
2 AEROSOL, METERED RESPIRATORY (INHALATION) EVERY 4 HOURS PRN
Qty: 18 G | Refills: 11 | Status: SHIPPED | OUTPATIENT
Start: 2021-05-07 | End: 2021-06-03 | Stop reason: SDUPTHER

## 2021-05-07 NOTE — PROGRESS NOTES
Tanvir Chance is a 70 y.o. male here for evaluation of   Chief Complaint   Patient presents with   • COPD       Problem list:  1. Dyspnea on exertion  2. Moderate COPD  3. Esophageal dysphagia  4. Hypertension  5. BPH  6. Allergic rhinitis  7. Arthritis  8. Hemorrhoids  9. Diverticulosis  10. Dental implants  11. Colonoscopy, 2019, no polyps  12. Remote tonsillectomy  13. Left wrist open reduction internal fixation  14. Bilateral inguinal hernias with multiple repairs bilaterally  15. Lumbar disc surgery, remote, without sequelae  16. Bilateral total knee replacement  17. Right shoulder rotator cuff repair  18. Remote tobacco, quitting in 1987 after 25 years  19. Adopted    History of Present Illness    70-year-old gentleman, remote smoker, quitting in 1987 here to evaluate exertional dyspnea.  He admits to starting cigarette smoking at around age 12 years but stopping in his late 30s.  He always smoked less than a pack a day.  He had absolutely no respiratory problems, shortness of air, wheezing, inhalers until around 2017 or 2018.  At that time he noticed some wheezing with exertion.  He has been tried on Symbicort and then Advair and now finally Trelegy.  He is not sure they are that helpful.  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  He does notice that when they do practice drills with the artificial smoke that it bothers his breathing a lot.  Also when he is at an active fire it bothers his breathing.  He does have a lot of allergies and drainage.  However, his wheezing is almost entirely exertional.  Once he is asleep he does not awaken with wheezing.  If he is outdoors pollen does not cause wheezing.  He has no history of childhood asthma.  He does not use a rescue inhaler.  He does have allergies and drainage.  He has no history of aspirin sensitivity or nasal polyps.  He has not been to the emergency room or required  steroids for his wheezing.  Currently if he walks up 3 flight of stairs he will be short of air.  He does notice wheezing if he bends over to tie his shoes.  He denies chest tightness or pain with exertion.  He does not have exposure to mold.  If certain trees are in bloom and there is a lot of tree pollen he does have more wheezing and more drainage.  He does notice that when he is tries to eat something like steak if he does not cut it small and chew it a lot when he swallows he will start hiccuping and then he will cough up a bunch of liquid.  He does have a history of reflux but it is not often and usually goes away with a Tums or Rolaids.    Review of Systems   Constitutional: Positive for activity change. Negative for fatigue.   HENT: Positive for postnasal drip and trouble swallowing.    Eyes: Negative for visual disturbance.   Respiratory: Positive for shortness of breath and wheezing.    Cardiovascular: Positive for leg swelling. Negative for chest pain and palpitations.   Gastrointestinal: Negative for constipation, diarrhea and nausea.   Endocrine: Negative.    Genitourinary: Positive for frequency.   Musculoskeletal: Positive for arthralgias.   Skin: Negative.    Allergic/Immunologic: Positive for environmental allergies.   Neurological: Positive for light-headedness.   Hematological: Negative.    Psychiatric/Behavioral: Positive for sleep disturbance. The patient is nervous/anxious.          Current Outpatient Medications:   •  amLODIPine (NORVASC) 5 MG tablet, Take 5 mg by mouth Daily. for blood pressure., Disp: , Rfl:   •  aspirin 81 MG EC tablet, Take 81 mg by mouth Daily., Disp: , Rfl:   •  atenolol (TENORMIN) 50 MG tablet, Take 50 mg by mouth Daily., Disp: , Rfl:   •  Loratadine (CLARITIN) 10 MG capsule, Take 10 mg by mouth Daily., Disp: , Rfl:   •  Multiple Vitamins-Minerals (MULTIVITAMIN ADULT PO), Take 1 tablet by mouth Daily., Disp: , Rfl:   •  naproxen sodium (ALEVE) 220 MG tablet, Take 220 mg  by mouth As Needed for Mild Pain (1-3)., Disp: , Rfl:   •  polyethylene glycol (MIRALAX) packet, Take 17 g by mouth Every Night., Disp: , Rfl:   •  tadalafil (CIALIS) 10 MG tablet, Take 10 mg by mouth Daily As Needed for erectile dysfunction., Disp: , Rfl:   •  Trelegy Ellipta 100-62.5-25 MCG/INH inhaler, , Disp: , Rfl:   •  albuterol sulfate  (90 Base) MCG/ACT inhaler, Inhale 2 puffs Every 4 (Four) Hours As Needed for Wheezing., Disp: 18 g, Rfl: 11  •  amoxicillin (AMOXIL) 500 MG capsule, Take 1,000 mg by mouth 3 (Three) Times a Day. RECENT ORAL SURGERY, Disp: , Rfl:   •  hydrocortisone (ANUSOL-HC) 2.5 % rectal cream, Insert  into the rectum Daily., Disp: , Rfl:     Past Medical History:   Diagnosis Date   • Allergic rhinitis    • Arthritis    • BPH (benign prostatic hyperplasia)    • Dental bridge present    • Diverticulosis    • Hemorrhoids    • Hypertension    • Inguinal hernia 5/10/2017   • Wears glasses      Past Surgical History:   Procedure Laterality Date   • BACK SURGERY      mid 30's   • COLONOSCOPY     • DENTAL PROCEDURE      implants   • DENTAL PROCEDURE  2017   • HERNIA REPAIR     • INGUINAL HERNIA REPAIR Right 5/10/2017    Procedure: INGUINAL HERNIA REPAIR RIGHT, UMBILICAL HERNIA REPAIR;  Surgeon: Ronni BENAVIDES MD;  Location: [facility];  Service:    • INGUINAL HERNIA REPAIR Left     age 2yrs   • JOINT REPLACEMENT      bilateral knees   • SHOULDER SURGERY      rotator cuff   • TONSILLECTOMY     • WRIST SURGERY Left     orif     Social History     Socioeconomic History   • Marital status:      Spouse name: Not on file   • Number of children: 2   • Years of education: Not on file   • Highest education level: Not on file   Tobacco Use   • Smoking status: Former Smoker     Packs/day: 1.00     Years: 25.00     Pack years: 25.00     Types: Cigarettes     Quit date: 1987     Years since quittin.0   • Smokeless tobacco: Never Used   Substance and Sexual Activity   • Alcohol  "use: No   • Drug use: No   • Sexual activity: Yes     Partners: Female     Birth control/protection: None     Family History   Adopted: Yes   Problem Relation Age of Onset   • Lung cancer Mother      Blood pressure 132/80, pulse 62, temperature 98.4 °F (36.9 °C), height 193 cm (76\"), weight (!) 138 kg (304 lb), SpO2 93 %.    Physical Exam  Vitals reviewed.   Constitutional:       General: He is not in acute distress.  HENT:      Head: Normocephalic and atraumatic.      Nose:      Comments: Nasal mucosa erythema.  No visible polyps     Mouth/Throat:      Comments: Clear postnasal drip as well as white postnasal drip and mild pharyngeal erythema without exudate  Eyes:      General: No scleral icterus.     Extraocular Movements: Extraocular movements intact.      Pupils: Pupils are equal, round, and reactive to light.   Neck:      Comments: No carotid bruit  Cardiovascular:      Rate and Rhythm: Normal rate and regular rhythm.      Heart sounds: Normal heart sounds. No murmur heard.     Pulmonary:      Effort: Pulmonary effort is normal.      Comments: Resonant to percussion.  Prolonged expiration.  Some wheezing with forced expiration  Abdominal:      General: Bowel sounds are normal. There is no distension.      Palpations: Abdomen is soft.      Tenderness: There is no abdominal tenderness.   Musculoskeletal:      Right lower leg: Edema present.      Left lower leg: Edema present.   Lymphadenopathy:      Cervical: No cervical adenopathy.   Skin:     General: Skin is warm and dry.   Neurological:      General: No focal deficit present.      Mental Status: He is alert and oriented to person, place, and time.   Psychiatric:         Mood and Affect: Mood normal.           PFTs:  6-minute walk test, resting heart rate 64, saturation 98% he was able to walk 1000 feet in 6 minutes.  Heart rate increased to 88.  Saturation declined to 92%.    FVC 4.18 L, 91%, FEV1 1.98 L, 58%, ratio 47%, moderate obstruction.  Total lung " capacity 7.07 L, 96%, residual volume 2.89 L, 108%, diffusion capacity 17.6, 66%.  Patient had a brief syncopal episode during his forced expiratory maneuver and therefore bronchodilator test was not performed    Outside spirometry done November 7, 2017 FVC 3.57 L, 75%, FEV1 1.81 L, 49% ratio 51%, after bronchodilator FVC 4.77 L, 100%, FEV1 2.47 L, 67% ratio 52% consistent with reactive airways disease        Radiology:  Chest x-ray reveals some calcified granulomatous changes, mild hyperinflation, no acute infiltrate or effusion    Lab:    Diagnoses and all orders for this visit:    1. Chronic obstructive pulmonary disease, unspecified COPD type (CMS/[facility]) (Primary)  -     XR Chest PA & Lateral  -     CBC & Differential  -     Basic Metabolic Panel  -     Allergens, Zone 8  -     BATOOL  -     Rheumatoid Factor  -     Sedimentation Rate  -     Pulmonary Function Test    2. WILSON (dyspnea on exertion)  -     Pulmonary Function Test    3. Esophageal dysphagia    4. Wheezing   -     Allergens, Zone 8    Other orders  -     albuterol sulfate  (90 Base) MCG/ACT inhaler; Inhale 2 puffs Every 4 (Four) Hours As Needed for Wheezing.  Dispense: 18 g; Refill: 11        Discussion:     70-year-old gentleman, former smoker here for evaluation of exertional dyspnea.  It is interesting that he quit smoking many many many years ago and had absolutely no difficulty with exertion until the last 3 years.  Clearly he has symptoms of wheezing and has airways obstruction on his pulmonary function testing.  He does not have active wheezing on examination today.  His symptoms are overwhelmingly secondary to exertion but occasionally also to pollen.  He has physical exam evidence of significant postnasal drip.  In addition he has some trouble swallowing meats.  He does not have symptoms to suggest laryngeal reflux.  Pulmonary function test revealed an FEV1 of 58%, however his small airways are only 23%.  Given his smoke exposure I wonder  if he has a component of inhalation injury with fixed obstruction.  There are diseases like rheumatoid arthritis or certain vasculitis as that can cause a bronchiolitis obliterans.  The lung disease can proceed the joint disease at times as well.  I also wonder if there is a cardiac component.  He is adopted so his family history is unknown.  While he does not have angina certainly exertional dyspnea can be the presenting factor with coronary artery disease.  I do not appreciate a murmur.  He had a brief syncopal episode with his forced expiratory maneuver on the pulmonary function test.  I contemplated whether this was from an intracardiac shunt with hypoxia or just a vagal response.  He is on a beta-blocker which might prevent a vagal response.  Also on his 6-minute walk test he did desaturate to 92%.  I have to at least consider pulmonary thromboembolic disease because his diffusion capacity is low and his oxygen desaturation.  He does not clinically have a history of pleurisy or hemoptysis.  He also has no previous history of DVT.  He does have bilateral lower extremity edema.  He attributes this to his 2 previous knee replacements.  This does not meet requirement for supplemental oxygen but really with an FEV1 of 58% and no obvious interstitial lung disease on his chest x-ray he should not desaturate.  He certainly has a lot of allergies and drainage clinically and therefore asthma is still in the differential or an overlap of asthma and COPD.  Finally if he does have an esophageal stricture he might be silently aspirating at night which might be precipitating some of his respiratory events.  He has completed the vaccine for COVID-19 his last dose was January 27, 2021      CBC with differential to look for eosinophilia  Allergy zone 8  BATOOL screen and sedimentation rate and rheumatoid factor  BMP for creatinine    Continue Trelegy 1 puff daily  Prescription for rescue inhaler to use before exercise and as needed  for wheezing    Echocardiogram with a bubble study to look at LV function and rule out shunt  Myocardial perfusion study to look for ischemic heart disease  EKG    CTA of the chest to rule out pulmonary thromboembolic disease, at least look at the lung interstitium    Upper GI series to rule out esophageal stricture    Follow-up after above studies        Sirisha Sol MD

## 2021-05-08 LAB — CHROMATIN AB SERPL-ACNC: <10 IU/ML (ref 0–14)

## 2021-05-09 LAB — ANA SER QL: NEGATIVE

## 2021-05-10 DIAGNOSIS — R13.19 ESOPHAGEAL DYSPHAGIA: ICD-10-CM

## 2021-05-10 DIAGNOSIS — R06.09 DOE (DYSPNEA ON EXERTION): Primary | ICD-10-CM

## 2021-05-13 LAB
A ALTERNATA IGE QN: <0.1 KU/L
A FUMIGATUS IGE QN: <0.1 KU/L
AMER ROACH IGE QN: <0.1 KU/L
BAHIA GRASS IGE QN: <0.1 KU/L
BERMUDA GRASS IGE QN: <0.1 KU/L
BOXELDER IGE QN: <0.1 KU/L
C HERBARUM IGE QN: <0.1 KU/L
CAT DANDER IGE QN: <0.1 KU/L
CMN PIGWEED IGE QN: <0.1 KU/L
COMMON RAGWEED IGE QN: <0.1 KU/L
CONV CLASS DESCRIPTION: NORMAL
D FARINAE IGE QN: <0.1 KU/L
D PTERONYSS IGE QN: <0.1 KU/L
DOG DANDER IGE QN: <0.1 KU/L
ENGL PLANTAIN IGE QN: <0.1 KU/L
HAZELNUT POLN IGE QN: <0.1 KU/L
JOHNSON GRASS IGE QN: <0.1 KU/L
KENT BLUE GRASS IGE QN: <0.1 KU/L
LONDON PLANE IGE QN: <0.1 KU/L
M RACEMOSUS IGE QN: <0.1 KU/L
MT JUNIPER IGE QN: <0.1 KU/L
MUGWORT IGE QN: <0.1 KU/L
NETTLE IGE QN: <0.1 KU/L
P NOTATUM IGE QN: <0.1 KU/L
S BOTRYOSUM IGE QN: <0.1 KU/L
SHEEP SORREL IGE QN: <0.1 KU/L
SWEET GUM IGE QN: <0.1 KU/L
WHITE ELM IGE QN: <0.1 KU/L
WHITE HICKORY IGE QN: <0.1 KU/L
WHITE MULBERRY IGE QN: <0.1 KU/L
WHITE OAK IGE QN: <0.1 KU/L

## 2021-05-23 ENCOUNTER — APPOINTMENT (OUTPATIENT)
Dept: PREADMISSION TESTING | Facility: HOSPITAL | Age: 71
End: 2021-05-23

## 2021-05-23 LAB — SARS-COV-2 RNA NOSE QL NAA+PROBE: NOT DETECTED

## 2021-05-23 PROCEDURE — U0005 INFEC AGEN DETEC AMPLI PROBE: HCPCS

## 2021-05-23 PROCEDURE — U0004 COV-19 TEST NON-CDC HGH THRU: HCPCS

## 2021-05-23 PROCEDURE — C9803 HOPD COVID-19 SPEC COLLECT: HCPCS

## 2021-05-25 ENCOUNTER — HOSPITAL ENCOUNTER (OUTPATIENT)
Dept: GENERAL RADIOLOGY | Facility: HOSPITAL | Age: 71
Discharge: HOME | End: 2021-05-25

## 2021-05-25 ENCOUNTER — HOSPITAL ENCOUNTER (OUTPATIENT)
Dept: CT IMAGING | Facility: HOSPITAL | Age: 71
Discharge: HOME | End: 2021-05-25

## 2021-05-25 DIAGNOSIS — R06.09 DOE (DYSPNEA ON EXERTION): ICD-10-CM

## 2021-05-25 DIAGNOSIS — R13.19 ESOPHAGEAL DYSPHAGIA: ICD-10-CM

## 2021-05-25 PROCEDURE — 71275 CT ANGIOGRAPHY CHEST: CPT

## 2021-05-25 PROCEDURE — 0 IOPAMIDOL PER 1 ML: Performed by: NURSE PRACTITIONER

## 2021-05-25 PROCEDURE — 74240 X-RAY XM UPR GI TRC 1CNTRST: CPT

## 2021-05-25 RX ADMIN — IOPAMIDOL 100 ML: 755 INJECTION, SOLUTION INTRAVENOUS at 08:13

## 2021-05-25 RX ADMIN — BARIUM SULFATE 183 ML: 960 POWDER, FOR SUSPENSION ORAL at 08:55

## 2021-05-27 ENCOUNTER — HOSPITAL ENCOUNTER (OUTPATIENT)
Dept: CARDIOLOGY | Facility: HOSPITAL | Age: 71
Discharge: HOME | End: 2021-05-27

## 2021-05-27 VITALS — WEIGHT: 304 LBS | BODY MASS INDEX: 37.02 KG/M2 | HEIGHT: 76 IN

## 2021-05-27 DIAGNOSIS — R06.09 DOE (DYSPNEA ON EXERTION): ICD-10-CM

## 2021-05-27 LAB
BH CV ECHO MEAS - AO MAX PG (FULL): 0 MMHG
BH CV ECHO MEAS - AO MAX PG: 9 MMHG
BH CV ECHO MEAS - AO MEAN PG (FULL): 0 MMHG
BH CV ECHO MEAS - AO MEAN PG: 5 MMHG
BH CV ECHO MEAS - AO ROOT AREA (BSA CORRECTED): 1.5
BH CV ECHO MEAS - AO ROOT AREA: 11.9 CM^2
BH CV ECHO MEAS - AO ROOT DIAM: 3.9 CM
BH CV ECHO MEAS - AO V2 MAX: 148 CM/SEC
BH CV ECHO MEAS - AO V2 MEAN: 108 CM/SEC
BH CV ECHO MEAS - AO V2 VTI: 30.7 CM
BH CV ECHO MEAS - ASC AORTA: 3.7 CM
BH CV ECHO MEAS - AVA(I,A): 3.1 CM^2
BH CV ECHO MEAS - AVA(I,D): 3.1 CM^2
BH CV ECHO MEAS - AVA(V,A): 3.5 CM^2
BH CV ECHO MEAS - AVA(V,D): 3.5 CM^2
BH CV ECHO MEAS - BSA(HAYCOCK): 2.8 M^2
BH CV ECHO MEAS - BSA: 2.6 M^2
BH CV ECHO MEAS - BZI_BMI: 37 KILOGRAMS/M^2
BH CV ECHO MEAS - BZI_METRIC_HEIGHT: 193 CM
BH CV ECHO MEAS - BZI_METRIC_WEIGHT: 137.9 KG
BH CV ECHO MEAS - EDV(CUBED): 110.6 ML
BH CV ECHO MEAS - EDV(MOD-SP2): 145 ML
BH CV ECHO MEAS - EDV(MOD-SP4): 123 ML
BH CV ECHO MEAS - EDV(TEICH): 107.5 ML
BH CV ECHO MEAS - EF(CUBED): 61.2 %
BH CV ECHO MEAS - EF(MOD-BP): 62.8 %
BH CV ECHO MEAS - EF(MOD-SP2): 64.3 %
BH CV ECHO MEAS - EF(MOD-SP4): 60.4 %
BH CV ECHO MEAS - EF(TEICH): 52.7 %
BH CV ECHO MEAS - ESV(CUBED): 42.9 ML
BH CV ECHO MEAS - ESV(MOD-SP2): 51.7 ML
BH CV ECHO MEAS - ESV(MOD-SP4): 48.7 ML
BH CV ECHO MEAS - ESV(TEICH): 50.9 ML
BH CV ECHO MEAS - FS: 27.1 %
BH CV ECHO MEAS - IVS/LVPW: 1
BH CV ECHO MEAS - IVSD: 1 CM
BH CV ECHO MEAS - LA DIMENSION: 3.5 CM
BH CV ECHO MEAS - LA/AO: 0.9
BH CV ECHO MEAS - LAD MAJOR: 5.7 CM
BH CV ECHO MEAS - LAT PEAK E' VEL: 12.2 CM/SEC
BH CV ECHO MEAS - LATERAL E/E' RATIO: 5
BH CV ECHO MEAS - LV DIASTOLIC VOL/BSA (35-75): 46.5 ML/M^2
BH CV ECHO MEAS - LV MASS(C)D: 170.2 GRAMS
BH CV ECHO MEAS - LV MASS(C)DI: 64.3 GRAMS/M^2
BH CV ECHO MEAS - LV MAX PG: 9 MMHG
BH CV ECHO MEAS - LV MEAN PG: 5 MMHG
BH CV ECHO MEAS - LV SYSTOLIC VOL/BSA (12-30): 18.4 ML/M^2
BH CV ECHO MEAS - LV V1 MAX: 150 CM/SEC
BH CV ECHO MEAS - LV V1 MEAN: 96.4 CM/SEC
BH CV ECHO MEAS - LV V1 VTI: 27.8 CM
BH CV ECHO MEAS - LVIDD: 4.8 CM
BH CV ECHO MEAS - LVIDS: 3.5 CM
BH CV ECHO MEAS - LVLD AP2: 8.1 CM
BH CV ECHO MEAS - LVLD AP4: 8.3 CM
BH CV ECHO MEAS - LVLS AP2: 6.6 CM
BH CV ECHO MEAS - LVLS AP4: 6.5 CM
BH CV ECHO MEAS - LVOT AREA (M): 3.5 CM^2
BH CV ECHO MEAS - LVOT AREA: 3.5 CM^2
BH CV ECHO MEAS - LVOT DIAM: 2.1 CM
BH CV ECHO MEAS - LVPWD: 1 CM
BH CV ECHO MEAS - MED PEAK E' VEL: 8.2 CM/SEC
BH CV ECHO MEAS - MEDIAL E/E' RATIO: 7.5
BH CV ECHO MEAS - MR MAX PG: 98 MMHG
BH CV ECHO MEAS - MR MAX VEL: 496 CM/SEC
BH CV ECHO MEAS - MV A MAX VEL: 74.6 CM/SEC
BH CV ECHO MEAS - MV DEC SLOPE: 311 CM/SEC^2
BH CV ECHO MEAS - MV DEC TIME: 0.25 SEC
BH CV ECHO MEAS - MV E MAX VEL: 61.3 CM/SEC
BH CV ECHO MEAS - MV E/A: 0.82
BH CV ECHO MEAS - MV P1/2T MAX VEL: 73.3 CM/SEC
BH CV ECHO MEAS - MV P1/2T: 69 MSEC
BH CV ECHO MEAS - MVA P1/2T LCG: 3 CM^2
BH CV ECHO MEAS - MVA(P1/2T): 3.2 CM^2
BH CV ECHO MEAS - PA ACC TIME: 0.16 SEC
BH CV ECHO MEAS - PA MAX PG: 6.7 MMHG
BH CV ECHO MEAS - PA PR(ACCEL): 9.3 MMHG
BH CV ECHO MEAS - PA V2 MAX: 129 CM/SEC
BH CV ECHO MEAS - RAP SYSTOLE: 3 MMHG
BH CV ECHO MEAS - SI(AO): 138.5 ML/M^2
BH CV ECHO MEAS - SI(CUBED): 25.6 ML/M^2
BH CV ECHO MEAS - SI(LVOT): 36.4 ML/M^2
BH CV ECHO MEAS - SI(MOD-SP2): 35.2 ML/M^2
BH CV ECHO MEAS - SI(MOD-SP4): 28.1 ML/M^2
BH CV ECHO MEAS - SI(TEICH): 21.4 ML/M^2
BH CV ECHO MEAS - SV(AO): 366.7 ML
BH CV ECHO MEAS - SV(CUBED): 67.7 ML
BH CV ECHO MEAS - SV(LVOT): 96.3 ML
BH CV ECHO MEAS - SV(MOD-SP2): 93.3 ML
BH CV ECHO MEAS - SV(MOD-SP4): 74.3 ML
BH CV ECHO MEAS - SV(TEICH): 56.7 ML
BH CV ECHO MEAS - TAPSE (>1.6): 2.6 CM
BH CV ECHO MEAS - TR MAX PG: 7 MMHG
BH CV ECHO MEAS - TR MAX VEL: 131 CM/SEC
BH CV ECHO MEASUREMENTS AVERAGE E/E' RATIO: 6.01
BH CV XLRA - RV BASE: 3.8 CM
BH CV XLRA - RV LENGTH: 8.2 CM
BH CV XLRA - RV MID: 3.6 CM
BH CV XLRA - TDI S': 20.6 CM/SEC
LEFT ATRIUM VOLUME INDEX: 17 ML/M2
LV EF 2D ECHO EST: 60 %

## 2021-05-27 PROCEDURE — 93306 TTE W/DOPPLER COMPLETE: CPT

## 2021-05-27 PROCEDURE — 93306 TTE W/DOPPLER COMPLETE: CPT | Performed by: INTERNAL MEDICINE

## 2021-06-01 ENCOUNTER — HOSPITAL ENCOUNTER (OUTPATIENT)
Dept: NUCLEAR MEDICINE | Facility: HOSPITAL | Age: 71
Discharge: HOME | End: 2021-06-01

## 2021-06-01 LAB
BH CV REST NUCLEAR ISOTOPE DOSE: 9.6 MCI
BH CV STRESS COMMENTS STAGE 1: NORMAL
BH CV STRESS DOSE REGADENOSON STAGE 1: 0.4
BH CV STRESS DURATION MIN STAGE 1: 0
BH CV STRESS DURATION SEC STAGE 1: 10
BH CV STRESS NUCLEAR ISOTOPE DOSE: 33.6 MCI
BH CV STRESS PROTOCOL 1: NORMAL
BH CV STRESS RECOVERY BP: NORMAL MMHG
BH CV STRESS RECOVERY HR: 70 BPM
BH CV STRESS STAGE 1: 1
LV EF NUC BP: 57 %
MAXIMAL PREDICTED HEART RATE: 150 BPM
PERCENT MAX PREDICTED HR: 58 %
STRESS BASELINE BP: NORMAL MMHG
STRESS BASELINE HR: 60 BPM
STRESS PERCENT HR: 68 %
STRESS POST PEAK BP: NORMAL MMHG
STRESS POST PEAK HR: 87 BPM
STRESS TARGET HR: 128 BPM

## 2021-06-01 PROCEDURE — 78452 HT MUSCLE IMAGE SPECT MULT: CPT | Performed by: INTERNAL MEDICINE

## 2021-06-01 PROCEDURE — 25010000002 REGADENOSON 0.4 MG/5ML SOLUTION: Performed by: NURSE PRACTITIONER

## 2021-06-01 PROCEDURE — A9500 TC99M SESTAMIBI: HCPCS | Performed by: NURSE PRACTITIONER

## 2021-06-01 PROCEDURE — 93017 CV STRESS TEST TRACING ONLY: CPT

## 2021-06-01 PROCEDURE — 78452 HT MUSCLE IMAGE SPECT MULT: CPT

## 2021-06-01 PROCEDURE — 0 TECHNETIUM SESTAMIBI: Performed by: NURSE PRACTITIONER

## 2021-06-01 PROCEDURE — 93018 CV STRESS TEST I&R ONLY: CPT | Performed by: INTERNAL MEDICINE

## 2021-06-01 RX ADMIN — REGADENOSON 0.4 MG: 0.08 INJECTION, SOLUTION INTRAVENOUS at 09:18

## 2021-06-01 RX ADMIN — TECHNETIUM TC 99M SESTAMIBI 1 DOSE: 1 INJECTION INTRAVENOUS at 09:18

## 2021-06-01 RX ADMIN — TECHNETIUM TC 99M SESTAMIBI 1 DOSE: 1 INJECTION INTRAVENOUS at 07:40

## 2021-06-01 NOTE — PROGRESS NOTES
Tanvir Chance is a 70 y.o. male here for evaluation of   Chief Complaint   Patient presents with   • Post CT     f/u       Problem list:  1. Dyspnea on exertion  2. Moderate COPD  3. Esophageal dysphagia  4. Hypertension  5. BPH  6. Allergic rhinitis  7. Arthritis  8. Hemorrhoids  9. Diverticulosis  10. Dental implants  11. Colonoscopy, 2019, no polyps  12. Remote tonsillectomy  13. Left wrist open reduction internal fixation  14. Bilateral inguinal hernias with multiple repairs bilaterally  15. Lumbar disc surgery, remote, without sequelae  16. Bilateral total knee replacement  17. Right shoulder rotator cuff repair  18. Remote tobacco, quitting in 1987 after 25 years  19. Adopted  20. No known drug allergies    History of Present Illness    70-year-old gentleman, remote smoker presenting with exertional dyspnea.  He had noted wheezing since 2017 or 18.  He had tried several inhalers without benefit.  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  FEV1 was 1.98 L, 58%.  He was placed on Trelegy 1 puff daily and a rescue inhaler.  Cannot tell any difference with WILSON on Trelegy.  Had cardiac stress test that was negative.  Echocardiogram revealed no valvular disease and mild diastolic dysfunction.  CTA of the chest was negative for pulmonary embolism but revealed significant upper lobe emphysematous findings.  His wife reports that she does hear wheezing when he dresses after he showers and with exertion.  It has been worse over the last year.  He also has trouble with food hanging when he swallows.  He really minimizes his reflux symptoms.  He did have an upper GI that revealed reflux to the midesophagus and a narrowing at the GE junction.    Review of Systems   Constitutional: Negative.    HENT: Positive for postnasal drip, sinus pressure, sneezing and tinnitus.    Respiratory: Positive for shortness of breath. Negative for cough and  wheezing.    Cardiovascular: Positive for chest pain.   Gastrointestinal: Negative.    Endocrine: Negative.    Genitourinary: Negative.    Musculoskeletal: Negative.    Allergic/Immunologic: Positive for environmental allergies.   Neurological: Negative.    Hematological: Negative.    Psychiatric/Behavioral: Negative.          Current Outpatient Medications:   •  albuterol sulfate  (90 Base) MCG/ACT inhaler, Inhale 2 puffs Every 4 (Four) Hours As Needed for Wheezing., Disp: 18 g, Rfl: 11  •  amLODIPine (NORVASC) 5 MG tablet, Take 5 mg by mouth Daily. for blood pressure., Disp: , Rfl:   •  amoxicillin (AMOXIL) 500 MG capsule, Take 1,000 mg by mouth 3 (Three) Times a Day. RECENT ORAL SURGERY, Disp: , Rfl:   •  aspirin 81 MG EC tablet, Take 81 mg by mouth Daily., Disp: , Rfl:   •  atenolol (TENORMIN) 50 MG tablet, Take 50 mg by mouth Daily., Disp: , Rfl:   •  hydrocortisone (ANUSOL-HC) 2.5 % rectal cream, Insert  into the rectum Daily., Disp: , Rfl:   •  Loratadine (CLARITIN) 10 MG capsule, Take 10 mg by mouth Daily., Disp: , Rfl:   •  Multiple Vitamins-Minerals (MULTIVITAMIN ADULT PO), Take 1 tablet by mouth Daily., Disp: , Rfl:   •  naproxen sodium (ALEVE) 220 MG tablet, Take 220 mg by mouth As Needed for Mild Pain (1-3)., Disp: , Rfl:   •  polyethylene glycol (MIRALAX) packet, Take 17 g by mouth Every Night., Disp: , Rfl:   •  tadalafil (CIALIS) 10 MG tablet, Take 10 mg by mouth Daily As Needed for erectile dysfunction., Disp: , Rfl:   •  tadalafil (CIALIS) 20 MG tablet, Take 10-20 mg by mouth Daily., Disp: , Rfl:   •  Trelegy Ellipta 100-62.5-25 MCG/INH inhaler, Inhale 1 puff Daily., Disp: , Rfl:   •  pantoprazole (PROTONIX) 40 MG EC tablet, Take 1 tablet by mouth Daily., Disp: 30 tablet, Rfl: 11    Past Medical History:   Diagnosis Date   • Allergic rhinitis    • Arthritis    • BPH (benign prostatic hyperplasia)    • Dental bridge present    • Diverticulosis    • Hemorrhoids    • Hypertension    • Inguinal  "hernia 5/10/2017   • Wears glasses      Past Surgical History:   Procedure Laterality Date   • BACK SURGERY      mid 30's   • COLONOSCOPY     • DENTAL PROCEDURE      implants   • DENTAL PROCEDURE  2017   • HERNIA REPAIR     • INGUINAL HERNIA REPAIR Right 5/10/2017    Procedure: INGUINAL HERNIA REPAIR RIGHT, UMBILICAL HERNIA REPAIR;  Surgeon: Ronni BENAVIDES MD;  Location: [facility];  Service:    • INGUINAL HERNIA REPAIR Left     age 2yrs   • JOINT REPLACEMENT      bilateral knees   • SHOULDER SURGERY      rotator cuff   • TONSILLECTOMY     • WRIST SURGERY Left     orif     Social History     Socioeconomic History   • Marital status:      Spouse name: Not on file   • Number of children: 2   • Years of education: Not on file   • Highest education level: Not on file   Tobacco Use   • Smoking status: Former Smoker     Packs/day: 1.00     Years: 25.00     Pack years: 25.00     Types: Cigarettes     Quit date: 1987     Years since quittin.0   • Smokeless tobacco: Never Used   Substance and Sexual Activity   • Alcohol use: No   • Drug use: No   • Sexual activity: Yes     Partners: Female     Birth control/protection: None     Family History   Adopted: Yes   Problem Relation Age of Onset   • Lung cancer Mother      Blood pressure 136/80, pulse 56, temperature 98.2 °F (36.8 °C), resp. rate 16, height 193 cm (76\"), weight (!) 142 kg (312 lb), SpO2 96 %.    Physical Exam      PFTs:  May 7, 2021, FVC 4.18 L, 91%, FEV1 1.98 L, 58% ratio 47%, residual volume 2.89 L, 108%, total lung capacity 7.07 L, 96%, diffusion capacity 17.6, 66%, moderate to severe obstruction, no restriction, moderate diffusion impairment    6-minute walk test, he was only able to walk at 1000 feet, saturation decreased to 90%, baseline 98%      Radiology:    COMPARISON: None.     FINDINGS: Thyroid is homogeneous in attenuation. No bulky mediastinal  adenopathy with calcified right paratracheal and subcarinal lymph nodes  of prior " healed granulomatous involvement. Central airways are patent.  Esophagus seen in normal course and caliber. Patent nonaneurysmal  thoracic aorta. Satisfactory opacification of the pulmonary arterial  tree and contrast bolus timing without filling defect to suggest  pulmonary embolus. Extended lung windows reveal central bronchiectasis  with moderate to severe upper lobe predominant centrilobular emphysema.  Subsegmental dependent atelectasis without dominant nodule or mass. No  pleural effusion. Degenerative changes of the thoracic spine without  aggressive osseous lesion.     IMPRESSION:  1. No PE.  2. Moderate to severe upper lobe predominant centrilobular emphysema  along with central bronchiectasis, however no focal consolidation or  effusion to suggest acute process.     D:  05/25/2021    COMPARISON: NONE     FINDINGS: Under fluoroscopic observation, patient ingested thin barium.  The oral phase of deglutition appeared normal. The esophageal mucosa  appeared grossly normal. There was mild esophageal dysmotility. There  was mild gastroesophageal reflux to the level of the midesophagus. There  is mild smooth luminal narrowing of the distal esophagus around the  level of the gastroesophageal junction. Luminal narrowing measures  approximately 7 mm in diameter, and may represent stricture, or spasm.  Further evaluation such as endoscopy may be considered if clinically  relevant. Examination of the stomach demonstrated a small sized  sliding-type hiatal hernia. The gastric folds and gastric mucosa  appeared grossly normal. There was no evidence of a gastric or duodenal  ulcer. There was no delay in gastric emptying. The duodenal bulb and  duodenal C-loop appeared grossly normal.        IMPRESSION:  1. Mild esophageal dysmotility  2. Mild gastroesophageal reflux to the level of the midesophagus  3. Mild smooth luminal narrowing of the distal esophagus, which may  represent stricture, or spasm. Please consider  endoscopy if clinically  relevant  4. Small sized sliding-type hiatal hernia         This report was finalized on 5/26/2021 9:59 PM by Dr. Carlos Mabry MD.     Interpretation Summary    · Estimated left ventricular EF = 60%  · The cardiac valves are anatomically and functionally normal.  · Saline test results are negative.         Lab:  Sedimentation rate 15, rheumatoid factor less than 10, BATOOL negative, white count 6.4, hemoglobin 16.5, 67 polys, 18 lymphocytes, 2.7% eosinophils, absolute eosinophil count 170.  Allergy zone 8 -, glucose 115, BUN 12, creatinine 0.89, sodium 142, potassium 4.6, chloride 105, bicarbonate 25.4          ,Diagnoses and all orders for this visit:    1. WILSON (dyspnea on exertion) (Primary)    2. Centrilobular emphysema (CMS/HCC)    3. GERD without esophagitis    Other orders  -     pantoprazole (PROTONIX) 40 MG EC tablet; Take 1 tablet by mouth Daily.  Dispense: 30 tablet; Refill: 11  -     albuterol sulfate  (90 Base) MCG/ACT inhaler; Inhale 2 puffs Every 4 (Four) Hours As Needed for Wheezing.  Dispense: 18 g; Refill: 11        Discussion:     #1 dyspnea on exertion, I suspect this is multifactorial from his moderate obstructive lung disease, weight gain and a component of wheezing that is possibly related to his reflux    #2 emphysema, he has a remote history of smoking and only 25-pack-year history.  But he has had significant smoke exposure that I think is contributing.  His FEV1 is 58% so he has moderate disease.  Resting room air saturation is 96%.  However he did drop to 90% on a 6-minute walk test    #3 GERD with possible stricture on upper GI series he takes no medication    Refer to pulmonary rehabilitation  Continue Trelegy 1 puff daily  Use albuterol rescue inhaler 2 puffs before significant exertion and as needed    Start Protonix 40 mg daily  Refer to  for EGD    Follow-up in 6 months    Sirisha Sol MD

## 2021-06-03 ENCOUNTER — OFFICE VISIT (OUTPATIENT)
Dept: PULMONOLOGY | Facility: CLINIC | Age: 71
End: 2021-06-03

## 2021-06-03 VITALS
WEIGHT: 312 LBS | DIASTOLIC BLOOD PRESSURE: 80 MMHG | TEMPERATURE: 98.2 F | RESPIRATION RATE: 16 BRPM | BODY MASS INDEX: 37.99 KG/M2 | HEART RATE: 56 BPM | HEIGHT: 76 IN | OXYGEN SATURATION: 96 % | SYSTOLIC BLOOD PRESSURE: 136 MMHG

## 2021-06-03 DIAGNOSIS — R06.09 DOE (DYSPNEA ON EXERTION): Primary | ICD-10-CM

## 2021-06-03 DIAGNOSIS — K21.9 GERD WITHOUT ESOPHAGITIS: ICD-10-CM

## 2021-06-03 DIAGNOSIS — J43.2 CENTRILOBULAR EMPHYSEMA (HCC): ICD-10-CM

## 2021-06-03 PROBLEM — E66.9 OBESITY (BMI 30-39.9): Status: ACTIVE | Noted: 2021-06-03

## 2021-06-03 PROCEDURE — 99213 OFFICE O/P EST LOW 20 MIN: CPT | Performed by: INTERNAL MEDICINE

## 2021-06-03 RX ORDER — ALBUTEROL SULFATE 90 UG/1
2 AEROSOL, METERED RESPIRATORY (INHALATION) EVERY 4 HOURS PRN
Qty: 18 G | Refills: 11 | Status: SHIPPED | OUTPATIENT
Start: 2021-06-03 | End: 2022-03-14 | Stop reason: SDUPTHER

## 2021-06-03 RX ORDER — TADALAFIL 20 MG/1
10-20 TABLET ORAL DAILY
COMMUNITY
Start: 2021-05-19 | End: 2022-03-14

## 2021-06-03 RX ORDER — PANTOPRAZOLE SODIUM 40 MG/1
40 TABLET, DELAYED RELEASE ORAL DAILY
Qty: 30 TABLET | Refills: 11 | Status: SHIPPED | OUTPATIENT
Start: 2021-06-03 | End: 2022-06-20 | Stop reason: SDUPTHER

## 2021-06-04 DIAGNOSIS — J43.2 CENTRILOBULAR EMPHYSEMA (HCC): ICD-10-CM

## 2021-06-04 DIAGNOSIS — K21.9 GERD WITHOUT ESOPHAGITIS: Primary | ICD-10-CM

## 2021-06-04 DIAGNOSIS — R13.19 ESOPHAGEAL DYSPHAGIA: ICD-10-CM

## 2021-07-07 ENCOUNTER — APPOINTMENT (OUTPATIENT)
Dept: CARDIOLOGY | Facility: HOSPITAL | Age: 71
End: 2021-07-07

## 2022-03-14 ENCOUNTER — OFFICE VISIT (OUTPATIENT)
Dept: PULMONOLOGY | Facility: CLINIC | Age: 72
End: 2022-03-14

## 2022-03-14 VITALS
HEIGHT: 76 IN | SYSTOLIC BLOOD PRESSURE: 160 MMHG | WEIGHT: 315 LBS | TEMPERATURE: 97.5 F | HEART RATE: 54 BPM | BODY MASS INDEX: 38.36 KG/M2 | RESPIRATION RATE: 16 BRPM | DIASTOLIC BLOOD PRESSURE: 88 MMHG | OXYGEN SATURATION: 95 %

## 2022-03-14 DIAGNOSIS — R06.09 DOE (DYSPNEA ON EXERTION): Primary | ICD-10-CM

## 2022-03-14 DIAGNOSIS — J43.2 CENTRILOBULAR EMPHYSEMA: ICD-10-CM

## 2022-03-14 PROBLEM — K21.9 GERD WITHOUT ESOPHAGITIS: Status: RESOLVED | Noted: 2021-06-03 | Resolved: 2022-03-14

## 2022-03-14 PROBLEM — R13.19 ESOPHAGEAL DYSPHAGIA: Status: RESOLVED | Noted: 2021-05-07 | Resolved: 2022-03-14

## 2022-03-14 PROBLEM — K21.00 GASTROESOPHAGEAL REFLUX DISEASE WITH ESOPHAGITIS WITHOUT HEMORRHAGE: Status: ACTIVE | Noted: 2022-03-14

## 2022-03-14 PROCEDURE — 99213 OFFICE O/P EST LOW 20 MIN: CPT | Performed by: INTERNAL MEDICINE

## 2022-03-14 RX ORDER — VARDENAFIL HYDROCHLORIDE 10 MG/1
1 TABLET ORAL DAILY
COMMUNITY

## 2022-03-14 RX ORDER — IPRATROPIUM BROMIDE AND ALBUTEROL SULFATE 2.5; .5 MG/3ML; MG/3ML
3 SOLUTION RESPIRATORY (INHALATION)
Qty: 120 ML | Refills: 11 | Status: SHIPPED | OUTPATIENT
Start: 2022-03-14 | End: 2022-06-20 | Stop reason: SDUPTHER

## 2022-03-14 RX ORDER — ALBUTEROL SULFATE 90 UG/1
2 AEROSOL, METERED RESPIRATORY (INHALATION) EVERY 4 HOURS PRN
Qty: 18 G | Refills: 11 | Status: SHIPPED | OUTPATIENT
Start: 2022-03-14

## 2022-03-14 NOTE — PROGRESS NOTES
Tanvir Chance is a 71 y.o. male here for evaluation of   Chief Complaint   Patient presents with   • COPD     Pt. Say he is getting sob on small walks now       Problem list:  1. Dyspnea on exertion  2. Moderate COPD  3. Crandall's esophagus/stricture  4. Hypertension  5. BPH  6. Allergic rhinitis  7. Arthritis  8. Hemorrhoids  9. Diverticulosis  10. Dental implants  11. Colonoscopy, 2019, no polyps  12. EGD 8/4/21 dilation  13. Remote tonsillectomy  14. Left wrist open reduction internal fixation  15. Bilateral inguinal hernias with multiple repairs bilaterally  16. Lumbar disc surgery, remote, without sequelae  17. Bilateral total knee replacement  18. Right shoulder rotator cuff repair  19. Remote tobacco, quitting in 1987 after 25 years  20. Adopted  21. No known drug allergies    History of Present Illness    70-year-old gentleman, remote smoker presenting with exertional dyspnea.  He had noted wheezing since 2017 or 18.  He had tried several inhalers without benefit.  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  He also worked as a volunteer .  FEV1 was 1.98 L, 58%.  He was placed on Trelegy 1 puff daily and a rescue inhaler.  He saw no improvement with inhalers.  Echocardiogram revealed mild diastolic dysfunction, no valvular disease.  CTA of the chest was negative for pulmonary embolism but he had significant upper lobe emphysematous findings.  He also complained of dysphagia with food hanging up at times.  Upper GI revealed reflux to the midesophagus and narrowing at the GE junction.   Since his EGD and initiation of Protonix his reflux symptoms are well controlled and he has no swallowing issues.  However he continues to be very short of breath walking to the mailbox, steps.  He does note wheezing with any exertion and every morning he wakes up he has loud wheezing.  He does have an intermittent cough occasionally productive of mucoid secretions.  Trelegy was  too expensive so he is currently using Advair.  He is taking it 2 puffs once a day.  He has a Ventolin inhaler and gets some improvement but not complete relief of his wheezing.        Review of Systems   Respiratory: Positive for cough, shortness of breath and wheezing.    Cardiovascular: Negative for chest pain and leg swelling.         Current Outpatient Medications:   •  albuterol sulfate  (90 Base) MCG/ACT inhaler, Inhale 2 puffs Every 4 (Four) Hours As Needed for Wheezing., Disp: 18 g, Rfl: 11  •  amLODIPine (NORVASC) 5 MG tablet, Take 5 mg by mouth Daily. for blood pressure., Disp: , Rfl:   •  amoxicillin (AMOXIL) 500 MG capsule, Take 1,000 mg by mouth 3 (Three) Times a Day. RECENT ORAL SURGERY, Disp: , Rfl:   •  aspirin 81 MG EC tablet, Take 81 mg by mouth Daily., Disp: , Rfl:   •  atenolol (TENORMIN) 50 MG tablet, Take 50 mg by mouth Daily., Disp: , Rfl:   •  fluticasone-salmeterol (ADVAIR HFA) 115-21 MCG/ACT inhaler, Inhale 2 puffs 2 (Two) Times a Day., Disp: 12 g, Rfl: 11  •  hydrocortisone (ANUSOL-HC) 2.5 % rectal cream, Insert  into the rectum Daily., Disp: , Rfl:   •  ipratropium-albuterol (DUO-NEB) 0.5-2.5 mg/3 ml nebulizer, Take 3 mL by nebulization 4 (Four) Times a Day., Disp: 120 mL, Rfl: 11  •  Loratadine (CLARITIN) 10 MG capsule, Take 10 mg by mouth Daily., Disp: , Rfl:   •  Multiple Vitamins-Minerals (MULTIVITAMIN ADULT PO), Take 1 tablet by mouth Daily., Disp: , Rfl:   •  naproxen sodium (ALEVE) 220 MG tablet, Take 220 mg by mouth As Needed for Mild Pain (1-3)., Disp: , Rfl:   •  pantoprazole (PROTONIX) 40 MG EC tablet, Take 1 tablet by mouth Daily., Disp: 30 tablet, Rfl: 11  •  polyethylene glycol (MIRALAX) packet, Take 17 g by mouth Every Night., Disp: , Rfl:   •  tadalafil (CIALIS) 10 MG tablet, Take 10 mg by mouth Daily As Needed for erectile dysfunction., Disp: , Rfl:   •  vardenafil (LEVITRA) 10 MG tablet, Take 1 tablet by mouth Daily., Disp: , Rfl:     Past Medical History:  "  Diagnosis Date   • Allergic rhinitis    • Arthritis    • BPH (benign prostatic hyperplasia)    • Dental bridge present    • Diverticulosis    • Hemorrhoids    • Hypertension    • Inguinal hernia 5/10/2017   • Wears glasses      Past Surgical History:   Procedure Laterality Date   • BACK SURGERY      mid 30's   • COLONOSCOPY     • DENTAL PROCEDURE      implants   • DENTAL PROCEDURE  2017   • HERNIA REPAIR     • INGUINAL HERNIA REPAIR Right 5/10/2017    Procedure: INGUINAL HERNIA REPAIR RIGHT, UMBILICAL HERNIA REPAIR;  Surgeon: Ronni BENAVIDES MD;  Location: [facility];  Service:    • INGUINAL HERNIA REPAIR Left     age 2yrs   • JOINT REPLACEMENT      bilateral knees   • SHOULDER SURGERY      rotator cuff   • TONSILLECTOMY     • WRIST SURGERY Left     orif     Social History     Socioeconomic History   • Marital status:    • Number of children: 2   Tobacco Use   • Smoking status: Former Smoker     Packs/day: 1.00     Years: 25.00     Pack years: 25.00     Types: Cigarettes     Quit date: 1987     Years since quittin.8   • Smokeless tobacco: Never Used   Substance and Sexual Activity   • Alcohol use: No   • Drug use: No   • Sexual activity: Yes     Partners: Female     Birth control/protection: None     Family History   Adopted: Yes   Problem Relation Age of Onset   • Lung cancer Mother      Blood pressure 160/88, pulse 54, temperature 97.5 °F (36.4 °C), temperature source Infrared, resp. rate 16, height 193 cm (75.98\"), weight (!) 147 kg (323 lb), SpO2 95 %.    Physical Exam  Constitutional:       Appearance: He is obese.   HENT:      Head: Normocephalic and atraumatic.   Eyes:      Conjunctiva/sclera: Conjunctivae normal.   Cardiovascular:      Rate and Rhythm: Normal rate and regular rhythm.      Heart sounds: No murmur heard.  Pulmonary:      Breath sounds: No wheezing or rhonchi.   Lymphadenopathy:      Cervical: No cervical adenopathy.   Neurological:      Mental Status: He is alert and " oriented to person, place, and time.           PFTs:  May 7, 2021, FVC 4.18 L, 91%, FEV1 1.98 L, 58% ratio 47%, residual volume 2.89 L, 108%, total lung capacity 7.07 L, 96%, diffusion capacity 17.6, 66%, moderate to severe obstruction, no restriction, moderate diffusion impairment    6-minute walk test, he was only able to walk at 1000 feet, saturation decreased to 90%, baseline 98%      Radiology:    COMPARISON: None.     FINDINGS: Thyroid is homogeneous in attenuation. No bulky mediastinal  adenopathy with calcified right paratracheal and subcarinal lymph nodes  of prior healed granulomatous involvement. Central airways are patent.  Esophagus seen in normal course and caliber. Patent nonaneurysmal  thoracic aorta. Satisfactory opacification of the pulmonary arterial  tree and contrast bolus timing without filling defect to suggest  pulmonary embolus. Extended lung windows reveal central bronchiectasis  with moderate to severe upper lobe predominant centrilobular emphysema.  Subsegmental dependent atelectasis without dominant nodule or mass. No  pleural effusion. Degenerative changes of the thoracic spine without  aggressive osseous lesion.     IMPRESSION:  1. No PE.  2. Moderate to severe upper lobe predominant centrilobular emphysema  along with central bronchiectasis, however no focal consolidation or  effusion to suggest acute process.     D:  05/25/2021    COMPARISON: NONE     FINDINGS: Under fluoroscopic observation, patient ingested thin barium.  The oral phase of deglutition appeared normal. The esophageal mucosa  appeared grossly normal. There was mild esophageal dysmotility. There  was mild gastroesophageal reflux to the level of the midesophagus. There  is mild smooth luminal narrowing of the distal esophagus around the  level of the gastroesophageal junction. Luminal narrowing measures  approximately 7 mm in diameter, and may represent stricture, or spasm.  Further evaluation such as endoscopy may be  considered if clinically  relevant. Examination of the stomach demonstrated a small sized  sliding-type hiatal hernia. The gastric folds and gastric mucosa  appeared grossly normal. There was no evidence of a gastric or duodenal  ulcer. There was no delay in gastric emptying. The duodenal bulb and  duodenal C-loop appeared grossly normal.        IMPRESSION:  1. Mild esophageal dysmotility  2. Mild gastroesophageal reflux to the level of the midesophagus  3. Mild smooth luminal narrowing of the distal esophagus, which may  represent stricture, or spasm. Please consider endoscopy if clinically  relevant  4. Small sized sliding-type hiatal hernia         This report was finalized on 5/26/2021 9:59 PM by Dr. Carlos Mabry MD.     Interpretation Summary    · Estimated left ventricular EF = 60%  · The cardiac valves are anatomically and functionally normal.  · Saline test results are negative.         Lab:  Sedimentation rate 15, rheumatoid factor less than 10, BATOOL negative, white count 6.4, hemoglobin 16.5, 67 polys, 18 lymphocytes, 2.7% eosinophils, absolute eosinophil count 170.  Allergy zone 8 -, glucose 115, BUN 12, creatinine 0.89, sodium 142, potassium 4.6, chloride 105, bicarbonate 25.4    November 9, 2021, esophageal biopsy consistent with Crandall's      ,Diagnoses and all orders for this visit:    1. WILSON (dyspnea on exertion) (Primary)    2. Centrilobular emphysema (HCC)    Other orders  -     ipratropium-albuterol (DUO-NEB) 0.5-2.5 mg/3 ml nebulizer; Take 3 mL by nebulization 4 (Four) Times a Day.  Dispense: 120 mL; Refill: 11  -     albuterol sulfate  (90 Base) MCG/ACT inhaler; Inhale 2 puffs Every 4 (Four) Hours As Needed for Wheezing.  Dispense: 18 g; Refill: 11  -     fluticasone-salmeterol (ADVAIR HFA) 115-21 MCG/ACT inhaler; Inhale 2 puffs 2 (Two) Times a Day.  Dispense: 12 g; Refill: 11        Discussion:     #1 dyspnea on exertion, I suspect this is multifactorial from his moderate obstructive lung  disease, weight gain.  Noninvasive cardiac work-up was negative.  Blood pressure was mildly increased today and might be playing a role.  He is currently on atenolol with a heart rate in the 50s so I cannot increase that dose.    #2 emphysema, he has a remote history of smoking and only 25-pack-year history.  But he has had significant smoke exposure that I think is contributing.  CT scan has significant emphysema.  His FEV1 is 58% so he has moderate disease.  Resting room air saturation is 96%.  However he did drop to 90% on a 6-minute walk test.  I do not hear wheezing on examination today but he admits to daily wheezing.  He also has intermittent mucoid secretions.  He is not getting enough symptomatic relief from his rescue inhaler and would benefit from a nebulizer.    #3 GERD with stricture and EGD showing a Crandall's esophagus.  Since he underwent dilation, he no longer has difficulty swallowing.  Protonix is controlling his symptoms.      Arrange a nebulizer with DuoNeb 4 times daily  Continue Advair 250-50 mcg 1 puff twice daily till he completes the disc and then changed to the  mcg, 2 puffs twice daily, as he does not like the dry powders    He has a sample of Symbicort 160 that he can use 2 puffs twice daily after he completes his Advair but never together    Continue Mucinex 1200 mg daily    I still think he would benefit from some sort of pulmonary rehabilitation.  I am to have my staff check into home pulmonary rehab    Encourage weight loss, discussed strategies      Follow-up in 3 months, with low-dose screening CAT scan    Sirisha Sol MD

## 2022-03-15 DIAGNOSIS — R06.09 DOE (DYSPNEA ON EXERTION): Primary | ICD-10-CM

## 2022-06-20 ENCOUNTER — OFFICE VISIT (OUTPATIENT)
Dept: PULMONOLOGY | Facility: CLINIC | Age: 72
End: 2022-06-20

## 2022-06-20 VITALS
OXYGEN SATURATION: 94 % | BODY MASS INDEX: 37.14 KG/M2 | DIASTOLIC BLOOD PRESSURE: 76 MMHG | WEIGHT: 305 LBS | TEMPERATURE: 97.3 F | HEART RATE: 58 BPM | HEIGHT: 76 IN | SYSTOLIC BLOOD PRESSURE: 118 MMHG

## 2022-06-20 DIAGNOSIS — R06.09 DOE (DYSPNEA ON EXERTION): ICD-10-CM

## 2022-06-20 DIAGNOSIS — K21.00 GASTROESOPHAGEAL REFLUX DISEASE WITH ESOPHAGITIS WITHOUT HEMORRHAGE: ICD-10-CM

## 2022-06-20 DIAGNOSIS — J43.2 CENTRILOBULAR EMPHYSEMA: Primary | ICD-10-CM

## 2022-06-20 PROCEDURE — 99213 OFFICE O/P EST LOW 20 MIN: CPT | Performed by: INTERNAL MEDICINE

## 2022-06-20 RX ORDER — PANTOPRAZOLE SODIUM 40 MG/1
40 TABLET, DELAYED RELEASE ORAL DAILY
Qty: 30 TABLET | Refills: 11 | Status: SHIPPED | OUTPATIENT
Start: 2022-06-20

## 2022-06-20 RX ORDER — IPRATROPIUM BROMIDE AND ALBUTEROL SULFATE 2.5; .5 MG/3ML; MG/3ML
3 SOLUTION RESPIRATORY (INHALATION)
Qty: 120 ML | Refills: 11 | Status: SHIPPED | OUTPATIENT
Start: 2022-06-20

## 2022-06-20 RX ORDER — MONTELUKAST SODIUM 10 MG/1
10 TABLET ORAL EVERY EVENING
COMMUNITY
Start: 2022-05-30

## 2022-06-20 NOTE — PROGRESS NOTES
Tanvir Chance is a 71 y.o. male here for evaluation of   Chief Complaint   Patient presents with   • post ct     F/u       Problem list:  1. Dyspnea on exertion  2. Moderate COPD  3. Crandall's esophagus/stricture  4. Hypertension  5. BPH  6. Allergic rhinitis  7. Arthritis  8. Hemorrhoids  9. Diverticulosis  10. Dental implants  11. Colonoscopy, 2019, no polyps  12. EGD 8/4/21 dilation  13. Remote tonsillectomy  14. Left wrist open reduction internal fixation  15. Bilateral inguinal hernias with multiple repairs bilaterally  16. Lumbar disc surgery, remote, without sequelae  17. Bilateral total knee replacement  18. Right shoulder rotator cuff repair  19. Remote tobacco, quitting in 1987 after 25 years  20. Adopted  21. No known drug allergies    History of Present Illness    70-year-old gentleman, remote smoker initially presenting with exertional dyspnea.  He had noted wheezing since 2017 or 18.  He had tried several inhalers without benefit.  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  He also worked as a volunteer .  FEV1 was 1.98 L, 58%.  He was placed on Trelegy 1 puff daily and a rescue inhaler.  He saw no improvement with inhalers.  Echocardiogram revealed mild diastolic dysfunction, no valvular disease.  CTA of the chest was negative for pulmonary embolism but he had significant upper lobe emphysematous findings.  He also complained of dysphagia with food hanging up at times.  Upper GI revealed reflux to the midesophagus and narrowing at the GE junction.  He did undergo an EGD and Protonix was initiated with good symptom control.  He continued to have dyspnea on exertion.  Trelegy was too expensive and he was changed to Advair.  He uses Ventolin as a rescue inhaler. WILSON unchanged. Denies any respiratory infections, bur has throat irritation. He does notice some hoarseness. Has chronic mucous daily. Hears wheezing with exertion. He has no trouble swallowing  since EGD with dilation.          Review of Systems   HENT: Positive for postnasal drip.    Respiratory: Positive for cough, shortness of breath and wheezing.    Cardiovascular: Positive for leg swelling.         Current Outpatient Medications:   •  albuterol sulfate  (90 Base) MCG/ACT inhaler, Inhale 2 puffs Every 4 (Four) Hours As Needed for Wheezing., Disp: 18 g, Rfl: 11  •  amLODIPine (NORVASC) 5 MG tablet, Take 5 mg by mouth Daily. for blood pressure., Disp: , Rfl:   •  aspirin 81 MG EC tablet, Take 81 mg by mouth Daily., Disp: , Rfl:   •  atenolol (TENORMIN) 50 MG tablet, Take 50 mg by mouth Daily., Disp: , Rfl:   •  hydrocortisone (ANUSOL-HC) 2.5 % rectal cream, Insert  into the rectum Daily., Disp: , Rfl:   •  ipratropium-albuterol (DUO-NEB) 0.5-2.5 mg/3 ml nebulizer, Take 3 mL by nebulization 4 (Four) Times a Day., Disp: 120 mL, Rfl: 11  •  Loratadine 10 MG capsule, Take 10 mg by mouth Daily., Disp: , Rfl:   •  montelukast (SINGULAIR) 10 MG tablet, Take 10 mg by mouth Every Evening., Disp: , Rfl:   •  Multiple Vitamins-Minerals (MULTIVITAMIN ADULT PO), Take 1 tablet by mouth Daily., Disp: , Rfl:   •  naproxen sodium (ALEVE) 220 MG tablet, Take 220 mg by mouth As Needed for Mild Pain (1-3)., Disp: , Rfl:   •  pantoprazole (PROTONIX) 40 MG EC tablet, Take 1 tablet by mouth Daily., Disp: 30 tablet, Rfl: 11  •  polyethylene glycol (MIRALAX) packet, Take 17 g by mouth Every Night., Disp: , Rfl:   •  tadalafil (CIALIS) 10 MG tablet, Take 10 mg by mouth Daily As Needed for erectile dysfunction., Disp: , Rfl:   •  vardenafil (LEVITRA) 10 MG tablet, Take 1 tablet by mouth Daily., Disp: , Rfl:   •  amoxicillin (AMOXIL) 500 MG capsule, Take 1,000 mg by mouth 3 (Three) Times a Day. RECENT ORAL SURGERY, Disp: , Rfl:   •  fluticasone-salmeterol (ADVAIR HFA) 115-21 MCG/ACT inhaler, Inhale 2 puffs 2 (Two) Times a Day. 2 puffs twice daily, with spacer, Disp: 1 each, Rfl: 11    Past Medical History:   Diagnosis Date  "  • Allergic rhinitis    • Arthritis    • BPH (benign prostatic hyperplasia)    • Dental bridge present    • Diverticulosis    • Hemorrhoids    • Hypertension    • Inguinal hernia 5/10/2017   • Wears glasses      Past Surgical History:   Procedure Laterality Date   • BACK SURGERY      mid 30's   • COLONOSCOPY     • DENTAL PROCEDURE      implants   • DENTAL PROCEDURE  2017   • HERNIA REPAIR     • INGUINAL HERNIA REPAIR Right 5/10/2017    Procedure: INGUINAL HERNIA REPAIR RIGHT, UMBILICAL HERNIA REPAIR;  Surgeon: Ronni BENAVIDES MD;  Location: [facility];  Service:    • INGUINAL HERNIA REPAIR Left     age 2yrs   • JOINT REPLACEMENT      bilateral knees   • SHOULDER SURGERY      rotator cuff   • TONSILLECTOMY     • WRIST SURGERY Left     orif     Social History     Socioeconomic History   • Marital status:    • Number of children: 2   Tobacco Use   • Smoking status: Former Smoker     Packs/day: 1.00     Years: 25.00     Pack years: 25.00     Types: Cigarettes     Quit date: 1987     Years since quittin.1   • Smokeless tobacco: Never Used   Substance and Sexual Activity   • Alcohol use: No   • Drug use: No   • Sexual activity: Yes     Partners: Female     Birth control/protection: None     Family History   Adopted: Yes   Problem Relation Age of Onset   • Lung cancer Mother      Blood pressure 118/76, pulse 58, temperature 97.3 °F (36.3 °C), temperature source Infrared, height 193 cm (75.98\"), weight (!) 138 kg (305 lb), SpO2 94 %.    Physical Exam  Constitutional:       General: He is not in acute distress.     Appearance: He is obese.   HENT:      Head: Normocephalic and atraumatic.   Cardiovascular:      Rate and Rhythm: Normal rate and regular rhythm.   Pulmonary:      Effort: Pulmonary effort is normal.      Breath sounds: No wheezing or rhonchi.   Abdominal:      Palpations: Abdomen is soft.   Musculoskeletal:      Right lower leg: Edema present.      Left lower leg: Edema present.   Skin:   "   General: Skin is warm and dry.   Neurological:      Mental Status: He is alert and oriented to person, place, and time.   Psychiatric:         Mood and Affect: Mood normal.           PFTs:  May 7, 2021, FVC 4.18 L, 91%, FEV1 1.98 L, 58% ratio 47%, residual volume 2.89 L, 108%, total lung capacity 7.07 L, 96%, diffusion capacity 17.6, 66%, moderate to severe obstruction, no restriction, moderate diffusion impairment    6-minute walk test, he was only able to walk at 1000 feet, saturation decreased to 90%, baseline 98%      Radiology:    COMPARISON: None.     FINDINGS: Thyroid is homogeneous in attenuation. No bulky mediastinal  adenopathy with calcified right paratracheal and subcarinal lymph nodes  of prior healed granulomatous involvement. Central airways are patent.  Esophagus seen in normal course and caliber. Patent nonaneurysmal  thoracic aorta. Satisfactory opacification of the pulmonary arterial  tree and contrast bolus timing without filling defect to suggest  pulmonary embolus. Extended lung windows reveal central bronchiectasis  with moderate to severe upper lobe predominant centrilobular emphysema.  Subsegmental dependent atelectasis without dominant nodule or mass. No  pleural effusion. Degenerative changes of the thoracic spine without  aggressive osseous lesion.     IMPRESSION:  1. No PE.  2. Moderate to severe upper lobe predominant centrilobular emphysema  along with central bronchiectasis, however no focal consolidation or  effusion to suggest acute process.     D:  05/25/2021    COMPARISON: NONE     FINDINGS: Under fluoroscopic observation, patient ingested thin barium.  The oral phase of deglutition appeared normal. The esophageal mucosa  appeared grossly normal. There was mild esophageal dysmotility. There  was mild gastroesophageal reflux to the level of the midesophagus. There  is mild smooth luminal narrowing of the distal esophagus around the  level of the gastroesophageal junction. Luminal  narrowing measures  approximately 7 mm in diameter, and may represent stricture, or spasm.  Further evaluation such as endoscopy may be considered if clinically  relevant. Examination of the stomach demonstrated a small sized  sliding-type hiatal hernia. The gastric folds and gastric mucosa  appeared grossly normal. There was no evidence of a gastric or duodenal  ulcer. There was no delay in gastric emptying. The duodenal bulb and  duodenal C-loop appeared grossly normal.        IMPRESSION:  1. Mild esophageal dysmotility  2. Mild gastroesophageal reflux to the level of the midesophagus  3. Mild smooth luminal narrowing of the distal esophagus, which may  represent stricture, or spasm. Please consider endoscopy if clinically  relevant  4. Small sized sliding-type hiatal hernia         This report was finalized on 5/26/2021 9:59 PM by Dr. Carlos Mabry MD.     Interpretation Summary    · Estimated left ventricular EF = 60%  · The cardiac valves are anatomically and functionally normal.  · Saline test results are negative.         Lab:  May 7, 2021 sedimentation rate 15, rheumatoid factor less than 10, BATOOL negative, white count 6.4, hemoglobin 16.5, 67 polys, 18 lymphocytes, 2.7% eosinophils, absolute eosinophil count 170.  Allergy zone 8 -, glucose 115, BUN 12, creatinine 0.89, sodium 142, potassium 4.6, chloride 105, bicarbonate 25.4    November 9, 2021, esophageal biopsy consistent with Crandall's      ,Diagnoses and all orders for this visit:    1. Centrilobular emphysema (HCC) (Primary)    2. WILSON (dyspnea on exertion)    3. Gastroesophageal reflux disease with esophagitis without hemorrhage    Other orders  -     fluticasone-salmeterol (ADVAIR HFA) 115-21 MCG/ACT inhaler; Inhale 2 puffs 2 (Two) Times a Day. 2 puffs twice daily, with spacer  Dispense: 1 each; Refill: 11  -     ipratropium-albuterol (DUO-NEB) 0.5-2.5 mg/3 ml nebulizer; Take 3 mL by nebulization 4 (Four) Times a Day.  Dispense: 120 mL; Refill:  11        Discussion:     Emphysema  25 pack years, quit 1987  May 2021, FEV1 1.98 L, 58%    Dyspnea on exertion  Echocardiogram with EF 60%  6-minute walk test without desaturation  CTA, June 2021, no PE, calcified granulomatous changes, central bronchiectasis and upper lobe predominant emphysema    GERD  EGD with dilation, July 2021, biopsy positive for Crandall's  Good symptom control on Protonix    Overall he feels improved after treatment of his stricture and reflux, bronchodilators.  He quit smoking 35 years ago so he does not qualify for the annual low-dose screening CAT scan.  He does continue to have intermittent cough and wheeze.  He has tried both Advair and Symbicort.  He does not like the disc and prefers the puffer.  When I placed an order for Symbicort it appeared to not be reimbursed so I restarted Advair.     nebulizer with DuoNeb every AM then as needed  Fluticisone-salmuterol 115 mcg, 2 puffs Q12H  Continue singular  Continue Protonix  6 months with 6-minute walk test      Sirisha Sol MD

## 2022-07-18 ENCOUNTER — TELEPHONE (OUTPATIENT)
Dept: PULMONOLOGY | Facility: CLINIC | Age: 72
End: 2022-07-18

## 2022-07-18 NOTE — TELEPHONE ENCOUNTER
Patient called stating he will be going to [place] and will be at an altitude of over 8,000[place]. He wants to know if he should be concerned about that affecting his breathing.

## 2022-07-19 NOTE — TELEPHONE ENCOUNTER
Certainly elevated conditions will affect his breathing due to thinning of the air and oxygen.  He will need to be careful, acclimate to the elevation difference and monitor his oxygen saturations.

## 2022-08-08 ENCOUNTER — HOSPITAL ENCOUNTER (OUTPATIENT)
Dept: ULTRASOUND IMAGING | Facility: HOSPITAL | Age: 72
Discharge: HOME | End: 2022-08-08
Payer: MEDICARE

## 2022-08-08 DIAGNOSIS — R22.42 MASS OF LOWER LEG, LEFT: ICD-10-CM

## 2022-08-08 PROCEDURE — 93971 EXTREMITY STUDY: CPT

## 2023-10-02 ENCOUNTER — OFFICE VISIT (OUTPATIENT)
Dept: PULMONOLOGY | Facility: CLINIC | Age: 73
End: 2023-10-02
Payer: MEDICARE

## 2023-10-02 VITALS
SYSTOLIC BLOOD PRESSURE: 132 MMHG | WEIGHT: 312 LBS | DIASTOLIC BLOOD PRESSURE: 82 MMHG | HEIGHT: 71 IN | TEMPERATURE: 97.3 F | OXYGEN SATURATION: 96 % | HEART RATE: 58 BPM | BODY MASS INDEX: 43.68 KG/M2

## 2023-10-02 DIAGNOSIS — R06.09 DOE (DYSPNEA ON EXERTION): Primary | ICD-10-CM

## 2023-10-02 DIAGNOSIS — K21.00 GASTROESOPHAGEAL REFLUX DISEASE WITH ESOPHAGITIS WITHOUT HEMORRHAGE: ICD-10-CM

## 2023-10-02 DIAGNOSIS — J43.2 CENTRILOBULAR EMPHYSEMA: ICD-10-CM

## 2023-10-02 RX ORDER — ALBUTEROL SULFATE 90 UG/1
4 AEROSOL, METERED RESPIRATORY (INHALATION) ONCE
Status: COMPLETED | OUTPATIENT
Start: 2023-10-02 | End: 2023-10-02

## 2023-10-02 RX ADMIN — ALBUTEROL SULFATE 4 PUFF: 90 AEROSOL, METERED RESPIRATORY (INHALATION) at 10:44

## 2023-10-02 NOTE — PROGRESS NOTES
Tanvir Chance is a 72 y.o. male here for evaluation of   Chief Complaint   Patient presents with    Centrilobular emphysema     Follow up       Problem list:  Dyspnea on exertion  Moderate COPD  COVID 2022  Crandall's esophagus/stricture  Hypertension  BPH  Allergic rhinitis  Arthritis  Hemorrhoids  Diverticulosis  Dental implants  Colonoscopy, 2019, no polyps  EGD 8/4/21 dilation  Remote tonsillectomy  Left wrist open reduction internal fixation  Bilateral inguinal hernias with multiple repairs bilaterally  Lumbar disc surgery, remote, without sequelae  Bilateral total knee replacement  Right shoulder rotator cuff repair  Remote tobacco, quitting in 1987 after 25 years  Adopted  No known drug allergies    History of Present Illness    72-year-old gentleman, remote smoker initially presenting with exertional dyspnea, wheezing, chronic mucoid secretions.  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  At his initial visit his FEV1 was decreased at 58% and he was placed on Trelegy 1 puff daily.  This was changed to Advair because of cost.  CTA of the chest was negative for pulmonary embolism but he had significant upper lobe emphysema.  He complained of dysphagia and had reflux to the midesophagus.  He underwent an EGD with dilation and was started on Protonix.  He saw Naa in June 2023 for worsening shortness of air when he was traveling at higher elevations.  He went to an urgent treatment center where he received Augmentin, steroid injection and oral prednisone.  He completed a steroid taper and was given some Tussionex for his cough.  He was restarted on Trelegy and continued his rescue inhaler. Her did increase dose to 200. Needs rescue 1-2x per weeks Denies nocturnal symptoms. Currently has no mucous. WILSON with stairs.      Review of Systems   Constitutional:  Negative for activity change.   HENT:  Positive  for postnasal drip. Negative for congestion and trouble swallowing.    Respiratory:  Positive for shortness of breath. Negative for cough and wheezing.    Cardiovascular:  Positive for leg swelling.   Allergic/Immunologic: Positive for environmental allergies.       Current Outpatient Medications:     albuterol sulfate  (90 Base) MCG/ACT inhaler, Inhale 2 puffs Every 4 (Four) Hours As Needed for Wheezing., Disp: 18 g, Rfl: 11    amLODIPine (NORVASC) 5 MG tablet, Take 1 tablet by mouth Daily. for blood pressure., Disp: , Rfl:     aspirin 81 MG EC tablet, Take 1 tablet by mouth Daily., Disp: , Rfl:     atenolol (TENORMIN) 50 MG tablet, Take 1 tablet by mouth Daily., Disp: , Rfl:     Boswellia-Glucosamine-Vit D (GLUCOSAMINE COMPLEX PO), Take 1,500 mg by mouth., Disp: , Rfl:     Hydrocod Terry-Chlorphe Terry ER (Tussionex Pennkinetic ER) 10-8 MG/5ML ER suspension, Take 5 mL by mouth Every 12 (Twelve) Hours As Needed for Cough., Disp: 180 mL, Rfl: 0    hydrocortisone (ANUSOL-HC) 2.5 % rectal cream, Insert  into the rectum Daily., Disp: , Rfl:     ibuprofen (ADVIL,MOTRIN) 400 MG tablet, Take 1 tablet by mouth Every 6 (Six) Hours As Needed for Mild Pain., Disp: , Rfl:     ipratropium-albuterol (DUO-NEB) 0.5-2.5 mg/3 ml nebulizer, Take 3 mL by nebulization 4 (Four) Times a Day., Disp: 120 mL, Rfl: 11    LISINOPRIL PO, Take 20 mg by mouth., Disp: , Rfl:     Loratadine 10 MG capsule, Take 1 capsule by mouth Daily., Disp: , Rfl:     methocarbamol (ROBAXIN) 750 MG tablet, Take 1 tablet by mouth 4 (Four) Times a Day As Needed for Muscle Spasms., Disp: , Rfl:     montelukast (SINGULAIR) 10 MG tablet, Take 1 tablet by mouth Every Evening., Disp: , Rfl:     MULTIPLE VITAMINS PO, Take  by mouth., Disp: , Rfl:     Multiple Vitamins-Minerals (MULTIVITAMIN ADULT PO), Take 1 tablet by mouth Daily., Disp: , Rfl:     naproxen sodium (ALEVE) 220 MG tablet, Take 1 tablet by mouth As Needed for Mild Pain., Disp: , Rfl:     pantoprazole  (PROTONIX) 40 MG EC tablet, Take 1 tablet by mouth Daily., Disp: 30 tablet, Rfl: 11    polyethylene glycol (MIRALAX) packet, Take 17 g by mouth Every Night., Disp: , Rfl:     tadalafil (CIALIS) 10 MG tablet, Take 1 tablet by mouth Daily As Needed for Erectile Dysfunction., Disp: , Rfl:     Trelegy Ellipta 100-62.5-25 MCG/ACT inhaler, Inhale 1 puff Daily., Disp: , Rfl:     vardenafil (LEVITRA) 10 MG tablet, Take 1 tablet by mouth Daily., Disp: , Rfl:     atenolol (TENORMIN) 50 MG tablet, Take 1 tablet by mouth Daily. (Patient not taking: Reported on 10/2/2023), Disp: , Rfl:     HYDROcodone-acetaminophen (NORCO)  MG per tablet, Take 1 tablet by mouth Every 6 (Six) Hours As Needed for Moderate Pain. (Patient not taking: Reported on 10/2/2023), Disp: , Rfl:     predniSONE (DELTASONE) 20 MG tablet, TAKE TWO (2) TABLETS BY MOUTH TWO TIMES DAILY (Patient not taking: Reported on 10/2/2023), Disp: , Rfl:     tadalafil (CIALIS) 10 MG tablet, Take 1 tablet by mouth Daily As Needed for Erectile Dysfunction. (Patient not taking: Reported on 10/2/2023), Disp: , Rfl:   No current facility-administered medications for this visit.    Past Medical History:   Diagnosis Date    Allergic rhinitis     Arthritis     BPH (benign prostatic hyperplasia)     Cancer     Dental bridge present     Diverticulosis     H/O rotator cuff tear     Hemorrhoids     Hypertension     Inguinal hernia 5/10/2017    Osteoarthritis     Pneumonia     Wears glasses      Past Surgical History:   Procedure Laterality Date    BACK SURGERY      mid 30's    COLONOSCOPY  2019    DENTAL PROCEDURE      implants    DENTAL PROCEDURE  05/04/2017    FRACTURE SURGERY      HERNIA REPAIR      INGUINAL HERNIA REPAIR Right 5/10/2017    Procedure: INGUINAL HERNIA REPAIR RIGHT, UMBILICAL HERNIA REPAIR;  Surgeon: Ronni BENAVIDES MD;  Location: [facility];  Service:     INGUINAL HERNIA REPAIR Left     age 2yrs    JOINT REPLACEMENT      bilateral knees    REPLACEMENT TOTAL KNEE  "BILATERAL      SHOULDER SURGERY      rotator cuff    TONSILLECTOMY      TONSILLECTOMY AND ADENOIDECTOMY      Knee    VASECTOMY      WRIST SURGERY Left     orif     Social History     Socioeconomic History    Marital status:     Number of children: 2   Tobacco Use    Smoking status: Former     Packs/day: 1.00     Years: 25.00     Pack years: 25.00     Types: Cigarettes     Quit date: 1987     Years since quittin.4    Smokeless tobacco: Never   Substance and Sexual Activity    Alcohol use: No     Comment: OCC    Drug use: No    Sexual activity: Yes     Partners: Female     Birth control/protection: None     Family History   Adopted: Yes   Problem Relation Age of Onset    Lung cancer Mother      Blood pressure 132/82, pulse 58, temperature 97.3 °F (36.3 °C), height 180.3 cm (71\"), weight (!) 142 kg (312 lb), SpO2 96 %.    Physical Exam  Constitutional:       General: He is not in acute distress.     Appearance: He is obese.   HENT:      Head: Normocephalic and atraumatic.      Nose: Congestion present.      Mouth/Throat:      Mouth: Mucous membranes are moist.      Comments: Clear PND  Neck:      Comments: No bruit  Cardiovascular:      Rate and Rhythm: Normal rate and regular rhythm.   Pulmonary:      Comments: Prolonged expiration  Musculoskeletal:      Right lower leg: Edema present.      Left lower leg: Edema present.   Lymphadenopathy:      Cervical: No cervical adenopathy.   Skin:     Findings: No rash.   Neurological:      Mental Status: He is oriented to person, place, and time.         PFTs:    10/2/23 6MWT no desaturation. Baseline 96%, walked 800ft, SaO2 96%    PFT FVC 3.91L 73%, after BD 3.91L 73%, FEV1 2.10L 53% after BD 2.37L 61%, ratio 54%    May 7, 2021, FVC 4.18 L, 91%, FEV1 1.98 L, 58% ratio 47%, residual volume 2.89 L, 108%, total lung capacity 7.07 L, 96%, diffusion capacity 17.6, 66%, moderate to severe obstruction, no restriction, moderate diffusion impairment    6-minute walk " test, he was only able to walk at 1000 feet, saturation decreased to 90%, baseline 98%      Radiology:    XR CHEST PA AND LATERAL     Date of Exam: 6/26/2023 11:49 AM EDT     Indication: Cough     Comparison: 5/7/2021.     Findings:  The heart size is normal. The pulmonary vascular markings are normal. There is stable scarring in the lung bases. There is no airspace disease, pleural effusion, or pneumothorax. There is degenerative spondylosis within the thoracic spine.     IMPRESSION:  Impression:     1. Stable scarring in the lung bases.  2. No superimposed active pulmonary disease.        Electronically Signed: Zander Judge    6/27/2023 1:56 PM EDT    COMPARISON: None.     FINDINGS: Thyroid is homogeneous in attenuation. No bulky mediastinal  adenopathy with calcified right paratracheal and subcarinal lymph nodes  of prior healed granulomatous involvement. Central airways are patent.  Esophagus seen in normal course and caliber. Patent nonaneurysmal  thoracic aorta. Satisfactory opacification of the pulmonary arterial  tree and contrast bolus timing without filling defect to suggest  pulmonary embolus. Extended lung windows reveal central bronchiectasis  with moderate to severe upper lobe predominant centrilobular emphysema.  Subsegmental dependent atelectasis without dominant nodule or mass. No  pleural effusion. Degenerative changes of the thoracic spine without  aggressive osseous lesion.     IMPRESSION:  1. No PE.  2. Moderate to severe upper lobe predominant centrilobular emphysema  along with central bronchiectasis, however no focal consolidation or  effusion to suggest acute process.     D:  05/25/2021    COMPARISON: NONE     FINDINGS: Under fluoroscopic observation, patient ingested thin barium.  The oral phase of deglutition appeared normal. The esophageal mucosa  appeared grossly normal. There was mild esophageal dysmotility. There  was mild gastroesophageal reflux to the level of the midesophagus.  There  is mild smooth luminal narrowing of the distal esophagus around the  level of the gastroesophageal junction. Luminal narrowing measures  approximately 7 mm in diameter, and may represent stricture, or spasm.  Further evaluation such as endoscopy may be considered if clinically  relevant. Examination of the stomach demonstrated a small sized  sliding-type hiatal hernia. The gastric folds and gastric mucosa  appeared grossly normal. There was no evidence of a gastric or duodenal  ulcer. There was no delay in gastric emptying. The duodenal bulb and  duodenal C-loop appeared grossly normal.        IMPRESSION:  1. Mild esophageal dysmotility  2. Mild gastroesophageal reflux to the level of the midesophagus  3. Mild smooth luminal narrowing of the distal esophagus, which may  represent stricture, or spasm. Please consider endoscopy if clinically  relevant  4. Small sized sliding-type hiatal hernia         This report was finalized on 5/26/2021 9:59 PM by Dr. Carlos Mabry MD.     Interpretation Summary    Estimated left ventricular EF = 60%  The cardiac valves are anatomically and functionally normal.  Saline test results are negative.         Lab:    April 7, 2023, white count 6.7, hemoglobin 16, platelet count 195, 73% polys, 15% lymphs, 2% eosinophils, glucose 174, BUN 19, creatinine 1.05, sodium 142, potassium 4.6, chloride 103, bicarbonate 23, calcium 8.7, total protein 6.4, albumin 4.1, bilirubin 0.8, alk phos 79, AST 27, ALT 27, free T41.42, TSH 3.56    May 7, 2021 sedimentation rate 15, rheumatoid factor less than 10, BATOOL negative, white count 6.4, hemoglobin 16.5, 67 polys, 18 lymphocytes, 2.7% eosinophils, absolute eosinophil count 170.  Allergy zone 8 -, glucose 115, BUN 12, creatinine 0.89, sodium 142, potassium 4.6, chloride 105, bicarbonate 25.4    November 9, 2021, esophageal biopsy consistent with Crandall's      ,Diagnoses and all orders for this visit:    1. WILSON (dyspnea on exertion) (Primary)  -      6 Minute Walk Test    2. Gastroesophageal reflux disease with esophagitis without hemorrhage    3. Centrilobular emphysema  -     albuterol sulfate HFA (PROVENTIL HFA;VENTOLIN HFA;PROAIR HFA) inhaler 4 puff  -     Spirometry - Pre & Post Bronchodilator        Discussion:     Emphysema  25 pack years, quit 1987  Chemical exposure and  with inhalation injury  May 2021, FEV1 2.10 L 53%  6MWT no desaturation  Exacerbation 6/28/23 steroids,ATBX    Dyspnea on exertion  Obesity  Echocardiogram with EF 60%  6-minute walk test without desaturation, 2023  CTA, June 2021, no PE, calcified granulomatous changes, central bronchiectasis and upper lobe predominant emphysema    GERD  EGD with dilation, July 2021, biopsy positive for Crandall's  Good symptom control on Protonix    72-year-old gentleman, remote smoker here for follow-up of some underlying emphysema, exertional dyspnea and GERD with stricture requiring dilation.  He had a recent exacerbation requiring antibiotics and steroids.    Trelegy 200/25mcg 1 puff daily  Albuterol HFA as needed  Protonix   Weight loss  Needs appt with [facility] to repeat EGD    Sirisha Sol MD

## 2024-01-08 RX ORDER — IPRATROPIUM BROMIDE AND ALBUTEROL SULFATE 2.5; .5 MG/3ML; MG/3ML
3 SOLUTION RESPIRATORY (INHALATION)
Qty: 120 ML | Refills: 11 | Status: SHIPPED | OUTPATIENT
Start: 2024-01-08

## 2024-01-08 RX ORDER — ALBUTEROL SULFATE 90 UG/1
2 AEROSOL, METERED RESPIRATORY (INHALATION) EVERY 4 HOURS PRN
Qty: 18 G | Refills: 11 | Status: SHIPPED | OUTPATIENT
Start: 2024-01-08

## 2024-01-17 RX ORDER — PANTOPRAZOLE SODIUM 40 MG/1
40 TABLET, DELAYED RELEASE ORAL DAILY
Qty: 30 TABLET | Refills: 11 | Status: SHIPPED | OUTPATIENT
Start: 2024-01-17
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
## 2024-07-09 ENCOUNTER — OFFICE VISIT (OUTPATIENT)
Dept: PULMONOLOGY | Facility: CLINIC | Age: 74
End: 2024-07-09
Payer: MEDICARE

## 2024-07-09 VITALS
TEMPERATURE: 97.3 F | DIASTOLIC BLOOD PRESSURE: 84 MMHG | SYSTOLIC BLOOD PRESSURE: 138 MMHG | WEIGHT: 301 LBS | HEART RATE: 62 BPM | BODY MASS INDEX: 42.14 KG/M2 | HEIGHT: 71 IN | OXYGEN SATURATION: 94 %

## 2024-07-09 DIAGNOSIS — K21.00 GASTROESOPHAGEAL REFLUX DISEASE WITH ESOPHAGITIS WITHOUT HEMORRHAGE: ICD-10-CM

## 2024-07-09 DIAGNOSIS — R06.09 DOE (DYSPNEA ON EXERTION): Primary | ICD-10-CM

## 2024-07-09 DIAGNOSIS — J43.2 CENTRILOBULAR EMPHYSEMA: ICD-10-CM

## 2024-07-09 PROCEDURE — 94375 RESPIRATORY FLOW VOLUME LOOP: CPT | Performed by: INTERNAL MEDICINE

## 2024-07-09 PROCEDURE — 99213 OFFICE O/P EST LOW 20 MIN: CPT | Performed by: INTERNAL MEDICINE

## 2024-07-09 PROCEDURE — G0009 ADMIN PNEUMOCOCCAL VACCINE: HCPCS | Performed by: INTERNAL MEDICINE

## 2024-07-09 PROCEDURE — 90677 PCV20 VACCINE IM: CPT | Performed by: INTERNAL MEDICINE

## 2024-07-09 RX ORDER — FLUTICASONE FUROATE, UMECLIDINIUM BROMIDE AND VILANTEROL TRIFENATATE 200; 62.5; 25 UG/1; UG/1; UG/1
1 POWDER RESPIRATORY (INHALATION)
Qty: 28 EACH | Refills: 11 | Status: SHIPPED | OUTPATIENT
Start: 2024-07-09
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
## 2024-07-09 NOTE — LETTER
[facility]  Vaccine Consent Form    Patient Name:  Tanvir Chance  Patient :  1950     Vaccine(s) Ordered    Pneumococcal Conjugate Vaccine 20-Valent (PCV20)        Screening Checklist  The following questions should be completed prior to vaccination. If you answer “yes” to any question, it does not necessarily mean you should not be vaccinated. It just means we may need to clarify or ask more questions. If a question is unclear, please ask your healthcare provider to explain it.    Yes No   Any fever or moderate to severe illness today (mild illness and/or antibiotic treatment are not contraindications)?     Do you have a history of a serious reaction to any previous vaccinations, such as anaphylaxis, encephalopathy within 7 days, Guillain-Somerset syndrome within 6 weeks, seizure?     Have you received any live vaccine(s) (e.g MMR, SWAPNIL) or any other vaccines in the last month (to ensure duplicate doses aren't given)?     Do you have an anaphylactic allergy to latex (DTaP, DTaP-IPV, Hep A, Hep B, MenB, RV, Td, Tdap), baker’s yeast (Hep B, HPV), polysorbates (RSV, nirsevimab, PCV 20, Rotavirrus, Tdap, Shingrix), or gelatin (SWAPNIL, MMR)?     Do you have an anaphylactic allergy to neomycin (Rabies, SWAPNIL, MMR, IPV, Hep A), polymyxin B (IPV), or streptomycin (IPV)?      Any cancer, leukemia, AIDS, or other immune system disorder? (SWAPNIL, MMR, RV)     Do you have a parent, brother, or sister with an immune system problem (if immune competence of vaccine recipient clinically verified, can proceed)? (MMR, SWAPNIL)     Any recent steroid treatments for >2 weeks, chemotherapy, or radiation treatment? (SWAPNIL, MMR)     Have you received antibody-containing blood transfusions or IVIG in the past 11 months (recommended interval is dependent on product)? (MMR, SWAPNIL)     Have you taken antiviral drugs (acyclovir, famciclovir, valacyclovir for SWAPNIL) in the last 24 or 48 hours, respectively?      Are you pregnant or planning to  "become pregnant within 1 month? (SWAPNIL, MMR, HPV, IPV, MenB, Abrexvy; For Hep B- refer to Engerix-B; For RSV - Abrysvo is indicated for 32-36 weeks of pregnancy from September to January)     For infants, have you ever been told your child has had intussusception or a medical emergency involving obstruction of the intestine (Rotavirus)? If not for an infant, can skip this question.         *Ordering Physicians/APC should be consulted if \"yes\" is checked by the patient or guardian above.  I have received, read, and understand the Vaccine Information Statement (VIS) for each vaccine ordered.  I have considered my or my child's health status as well as the health status of my close contacts.  I have taken the opportunity to discuss my vaccine questions with my or my child's health care provider.   I have requested that the ordered vaccine(s) be given to me or my child.  I understand the benefits and risks of the vaccines.  I understand that I should remain in the clinic for 15 minutes after receiving the vaccine(s).  _________________________________________________________  Signature of Patient or Parent/Legal Guardian ____________________  Date     "

## 2024-12-09 ENCOUNTER — OFFICE VISIT (OUTPATIENT)
Dept: PULMONOLOGY | Facility: CLINIC | Age: 74
End: 2024-12-09
Payer: MEDICARE

## 2024-12-09 VITALS
BODY MASS INDEX: 41.86 KG/M2 | HEART RATE: 102 BPM | HEIGHT: 71 IN | OXYGEN SATURATION: 92 % | WEIGHT: 299 LBS | DIASTOLIC BLOOD PRESSURE: 82 MMHG | SYSTOLIC BLOOD PRESSURE: 134 MMHG | TEMPERATURE: 98 F

## 2024-12-09 DIAGNOSIS — J43.2 CENTRILOBULAR EMPHYSEMA: Primary | ICD-10-CM

## 2024-12-09 DIAGNOSIS — R06.09 DOE (DYSPNEA ON EXERTION): ICD-10-CM

## 2024-12-09 DIAGNOSIS — R05.9 COUGH, UNSPECIFIED TYPE: ICD-10-CM

## 2024-12-09 DIAGNOSIS — K21.00 GASTROESOPHAGEAL REFLUX DISEASE WITH ESOPHAGITIS WITHOUT HEMORRHAGE: ICD-10-CM

## 2024-12-09 RX ORDER — APIXABAN 5 MG/1
1 TABLET, FILM COATED ORAL 2 TIMES DAILY
COMMUNITY

## 2024-12-09 RX ORDER — HYDROCODONE POLISTIREX AND CHLORPHENIRAMINE POLISTIREX 10; 8 MG/5ML; MG/5ML
5 SUSPENSION, EXTENDED RELEASE ORAL EVERY 12 HOURS PRN
Qty: 180 ML | Refills: 0 | Status: SHIPPED | OUTPATIENT
Start: 2024-12-09

## 2024-12-09 RX ORDER — METOPROLOL TARTRATE 25 MG/1
1 TABLET, FILM COATED ORAL 2 TIMES DAILY
COMMUNITY

## 2024-12-09 NOTE — LETTER
December 9, 2024     Mark Jones DO  [address] [facility] [address]    Patient: Tanvir Chance   YOB: 1950   Date of Visit: 12/9/2024     Dear Dr. Karen DO:    Thank you for referring Tanvir Chance to me for evaluation. Below are the relevant portions of my assessment and plan of care.    If you have questions, please do not hesitate to call me. I look forward to following Tanvir along with you.         Sincerely,        Sirisha Sol MD        CC: No Recipients      Progress Notes:  Tanvir Chance is a 74 y.o. male here for evaluation of   Chief Complaint   Patient presents with   • Shortness of Breath       Problem list:  Dyspnea on exertion  Moderate COPD  COVID 2022,10/24  Crandall's esophagus/stricture  Atrial fibrillation, October 2024  Hypertension  BPH  Allergic rhinitis  Arthritis  Hemorrhoids  Diverticulosis  [employer], hearing aids  Dental implants  Colonoscopy, 2019, no polyps  EGD 8/4/21 dilation, EGD October 16, 2024, Crandall's esophagus  Eye lid surgery  Remote tonsillectomy  Left wrist open reduction internal fixation  Bilateral inguinal hernias with multiple repairs bilaterally  Lumbar disc surgery, remote, without sequelae  Bilateral total knee replacement  Right shoulder rotator cuff repair  Remote tobacco, quitting in 1987 after 25 years  Adopted  No known drug allergies    History of Present Illness    74-year-old gentleman, remote smoker here for follow-up of COPD, GERD with stricture and Crandall's esophagitis, exertional dyspnea.  He was treated for bronchitis in April 2024 and treated with amoxicillin, prednisone.  He remains on Trelegy for maintenance inhaler.  He is not wheezing or coughing mucous.    He got COVID 1 month ago. Only had drainage. Took Paxlovid and steroids.     He had EGD 10/24. He was in atrial fibrillation, new onset. He had ECHO and nuclear stress test and they were normal. He is on metoprolol and Eliquis.          Review of  Systems   Constitutional:  Negative for activity change.   HENT:  Positive for postnasal drip.    Respiratory:  Positive for shortness of breath. Negative for cough and wheezing.    Cardiovascular:  Positive for palpitations.         Current Outpatient Medications:   •  albuterol sulfate  (90 Base) MCG/ACT inhaler, Inhale 2 puffs Every 4 (Four) Hours As Needed for Wheezing., Disp: 18 g, Rfl: 11  •  amLODIPine (NORVASC) 5 MG tablet, Take 1 tablet by mouth Daily. for blood pressure., Disp: , Rfl:   •  Boswellia-Glucosamine-Vit D (GLUCOSAMINE COMPLEX PO), Take 1,500 mg by mouth., Disp: , Rfl:   •  Eliquis 5 MG tablet tablet, Take 1 tablet by mouth 2 (Two) Times a Day., Disp: , Rfl:   •  Fluticasone-Umeclidin-Vilant (Trelegy Ellipta) 200-62.5-25 MCG/ACT inhaler, Inhale 1 puff Daily., Disp: 28 each, Rfl: 11  •  Hydrocod Terry-Chlorphe Terry ER (TUSSIONEX PENNKINETIC) 10-8 MG/5ML ER suspension, Take 5 mL by mouth Every 12 (Twelve) Hours As Needed for Cough., Disp: 180 mL, Rfl: 0  •  hydrocortisone (ANUSOL-HC) 2.5 % rectal cream, Insert  into the rectum Daily., Disp: , Rfl:   •  ibuprofen (ADVIL,MOTRIN) 400 MG tablet, Take 1 tablet by mouth Every 6 (Six) Hours As Needed for Mild Pain., Disp: , Rfl:   •  ipratropium-albuterol (DUO-NEB) 0.5-2.5 mg/3 ml nebulizer, Take 3 mL by nebulization 4 (Four) Times a Day., Disp: 120 mL, Rfl: 11  •  LISINOPRIL PO, Take 20 mg by mouth., Disp: , Rfl:   •  Loratadine 10 MG capsule, Take 1 capsule by mouth Daily., Disp: , Rfl:   •  methocarbamol (ROBAXIN) 750 MG tablet, Take 1 tablet by mouth 4 (Four) Times a Day As Needed for Muscle Spasms., Disp: , Rfl:   •  metoprolol tartrate (LOPRESSOR) 25 MG tablet, Take 1 tablet by mouth 2 (Two) Times a Day., Disp: , Rfl:   •  montelukast (SINGULAIR) 10 MG tablet, Take 1 tablet by mouth Every Evening., Disp: , Rfl:   •  MULTIPLE VITAMINS PO, Take  by mouth., Disp: , Rfl:   •  naproxen sodium (ALEVE) 220 MG tablet, Take 1 tablet by mouth As Needed  for Mild Pain., Disp: , Rfl:   •  pantoprazole (PROTONIX) 40 MG EC tablet, Take 1 tablet by mouth Daily., Disp: 30 tablet, Rfl: 11  •  polyethylene glycol (MIRALAX) packet, Take 17 g by mouth Every Night., Disp: , Rfl:   •  tadalafil (CIALIS) 10 MG tablet, Take 1 tablet by mouth Daily As Needed for Erectile Dysfunction., Disp: , Rfl:   •  vardenafil (LEVITRA) 10 MG tablet, Take 1 tablet by mouth Daily., Disp: , Rfl:   •  HYDROcodone-acetaminophen (NORCO)  MG per tablet, Take 1 tablet by mouth Every 6 (Six) Hours As Needed for Moderate Pain. (Patient not taking: Reported on 12/9/2024), Disp: , Rfl:   •  Multiple Vitamins-Minerals (MULTIVITAMIN ADULT PO), Take 1 tablet by mouth Daily. (Patient not taking: Reported on 7/9/2024), Disp: , Rfl:   •  tadalafil (CIALIS) 10 MG tablet, Take 1 tablet by mouth Daily As Needed for Erectile Dysfunction. (Patient not taking: Reported on 10/2/2023), Disp: , Rfl:     Past Medical History:   Diagnosis Date   • Allergic rhinitis    • Arthritis    • BPH (benign prostatic hyperplasia)    • Cancer    • Dental bridge present    • Diverticulosis    • H/O rotator cuff tear    • Hemorrhoids    • Hypertension    • Inguinal hernia 5/10/2017   • Osteoarthritis    • Pneumonia    • Wears glasses      Past Surgical History:   Procedure Laterality Date   • BACK SURGERY      mid 30's   • COLONOSCOPY  2019   • DENTAL PROCEDURE      implants   • DENTAL PROCEDURE  05/04/2017   • FRACTURE SURGERY     • HERNIA REPAIR     • INGUINAL HERNIA REPAIR Right 5/10/2017    Procedure: INGUINAL HERNIA REPAIR RIGHT, UMBILICAL HERNIA REPAIR;  Surgeon: Ronni BENAVIDES MD;  Location: [facility];  Service:    • INGUINAL HERNIA REPAIR Left     age 2yrs   • JOINT REPLACEMENT      bilateral knees   • REPLACEMENT TOTAL KNEE BILATERAL     • SHOULDER SURGERY      rotator cuff   • TONSILLECTOMY     • TONSILLECTOMY AND ADENOIDECTOMY      Knee   • VASECTOMY     • WRIST SURGERY Left     orif     Social History  "    Socioeconomic History   • Marital status:    • Number of children: 2   Tobacco Use   • Smoking status: Former     Current packs/day: 0.00     Average packs/day: 1 pack/day for 25.0 years (25.0 ttl pk-yrs)     Types: Cigarettes     Start date: 1962     Quit date: 1987     Years since quittin.6   • Smokeless tobacco: Never   Vaping Use   • Vaping status: Never Used   Substance and Sexual Activity   • Alcohol use: No     Comment: OCC   • Drug use: No   • Sexual activity: Yes     Partners: Female     Birth control/protection: None     Family History   Adopted: Yes   Problem Relation Age of Onset   • Lung cancer Mother      Blood pressure 134/82, pulse 102, temperature 98 °F (36.7 °C), temperature source Infrared, height 180.3 cm (70.98\"), weight 136 kg (299 lb), SpO2 92%.    Physical Exam  Constitutional:       General: He is not in acute distress.  HENT:      Head: Normocephalic.      Nose: Congestion present.      Mouth/Throat:      Pharynx: No oropharyngeal exudate.   Cardiovascular:      Rate and Rhythm: Normal rate. Rhythm irregular.      Heart sounds: No murmur heard.  Neurological:      Mental Status: He is alert.   Psychiatric:         Mood and Affect: Mood normal.           PFTs:    2024, FVC 3.69 L, 64%, FEV1 2.03 L, 55% ratio 55%, moderate obstruction, no change compared to 2023    10/2/23 6MWT no desaturation. Baseline 96%, walked 800ft, SaO2 96%    10/23 PFT FVC 3.91L 73%, after BD 3.91L 73%, FEV1 2.10L 53% after BD 2.37L 61%, ratio 54%    May 7, 2021, FVC 4.18 L, 91%, FEV1 1.98 L, 58% ratio 47%, residual volume 2.89 L, 108%, total lung capacity 7.07 L, 96%, diffusion capacity 17.6, 66%, moderate to severe obstruction, no restriction, moderate diffusion impairment    6-minute walk test, he was only able to walk at 1000 feet, saturation decreased to 90%, baseline 98%      Radiology:    Chest x-ray 2004 lungs are hyperinflated, no acute infiltrate heart is not " enlarged, no effusion or edema    XR CHEST PA AND LATERAL     Date of Exam: 6/26/2023 11:49 AM EDT     Indication: Cough     Comparison: 5/7/2021.     Findings:  The heart size is normal. The pulmonary vascular markings are normal. There is stable scarring in the lung bases. There is no airspace disease, pleural effusion, or pneumothorax. There is degenerative spondylosis within the thoracic spine.     IMPRESSION:  Impression:     1. Stable scarring in the lung bases.  2. No superimposed active pulmonary disease.        Electronically Signed: Zander Judge    6/27/2023 1:56 PM EDT    COMPARISON: None.     FINDINGS: Thyroid is homogeneous in attenuation. No bulky mediastinal  adenopathy with calcified right paratracheal and subcarinal lymph nodes  of prior healed granulomatous involvement. Central airways are patent.  Esophagus seen in normal course and caliber. Patent nonaneurysmal  thoracic aorta. Satisfactory opacification of the pulmonary arterial  tree and contrast bolus timing without filling defect to suggest  pulmonary embolus. Extended lung windows reveal central bronchiectasis  with moderate to severe upper lobe predominant centrilobular emphysema.  Subsegmental dependent atelectasis without dominant nodule or mass. No  pleural effusion. Degenerative changes of the thoracic spine without  aggressive osseous lesion.     IMPRESSION:  1. No PE.  2. Moderate to severe upper lobe predominant centrilobular emphysema  along with central bronchiectasis, however no focal consolidation or  effusion to suggest acute process.     D:  05/25/2021    COMPARISON: NONE     FINDINGS: Under fluoroscopic observation, patient ingested thin barium.  The oral phase of deglutition appeared normal. The esophageal mucosa  appeared grossly normal. There was mild esophageal dysmotility. There  was mild gastroesophageal reflux to the level of the midesophagus. There  is mild smooth luminal narrowing of the distal esophagus around  the  level of the gastroesophageal junction. Luminal narrowing measures  approximately 7 mm in diameter, and may represent stricture, or spasm.  Further evaluation such as endoscopy may be considered if clinically  relevant. Examination of the stomach demonstrated a small sized  sliding-type hiatal hernia. The gastric folds and gastric mucosa  appeared grossly normal. There was no evidence of a gastric or duodenal  ulcer. There was no delay in gastric emptying. The duodenal bulb and  duodenal C-loop appeared grossly normal.        IMPRESSION:  1. Mild esophageal dysmotility  2. Mild gastroesophageal reflux to the level of the midesophagus  3. Mild smooth luminal narrowing of the distal esophagus, which may  represent stricture, or spasm. Please consider endoscopy if clinically  relevant  4. Small sized sliding-type hiatal hernia         This report was finalized on 5/26/2021 9:59 PM by Dr. Carlos Mabry MD.     Interpretation Summary    Estimated left ventricular EF = 60%  The cardiac valves are anatomically and functionally normal.  Saline test results are negative.         Lab:    April 7, 2023, white count 6.7, hemoglobin 16, platelet count 195, 73% polys, 15% lymphs, 2% eosinophils, glucose 174, BUN 19, creatinine 1.05, sodium 142, potassium 4.6, chloride 103, bicarbonate 23, calcium 8.7, total protein 6.4, albumin 4.1, bilirubin 0.8, alk phos 79, AST 27, ALT 27, free T41.42, TSH 3.56    May 7, 2021 sedimentation rate 15, rheumatoid factor less than 10, BATOOL negative, white count 6.4, hemoglobin 16.5, 67 polys, 18 lymphocytes, 2.7% eosinophils, absolute eosinophil count 170.  Allergy zone 8 -, glucose 115, BUN 12, creatinine 0.89, sodium 142, potassium 4.6, chloride 105, bicarbonate 25.4    November 9, 2021, esophageal biopsy consistent with Crandall's      ,Diagnoses and all orders for this visit:    1. Centrilobular emphysema (Primary)    2. WILSON (dyspnea on exertion)    3. Gastroesophageal reflux disease with  esophagitis without hemorrhage    4. Cough, unspecified type  -     Hydrocod Terry-Chlorphe Terry ER (TUSSIONEX PENNKINETIC) 10-8 MG/5ML ER suspension; Take 5 mL by mouth Every 12 (Twelve) Hours As Needed for Cough.  Dispense: 180 mL; Refill: 0            Discussion:     Emphysema  25 pack years, quit 1987  Chemical exposure as  with inhalation injury  May 2021, FEV1 2.10 L 53%  October 2023, FEV1 2.10 L, 53%, pre-, 2.37 L, 60% post  July 9, 2024, FEV1 2.03 L, 55%  6MWT no desaturation, 2023  Exacerbation 4/24 steroids,ATBX  Tussionex PRN cough    Dyspnea on exertion  Obesity (299 pounds)  Echocardiogram with EF 60%  6-minute walk test without desaturation, 2023  CTA, June 2021, no PE, calcified granulomatous changes, central bronchiectasis and upper lobe predominant emphysema  No regular exercise, significant deconditioning  New onset atrial fibrillation may be playing a role    GERD  EGD with dilation, July 2021, biopsy positive for Crandall's  EGD 10/16/24  Good symptom control on Protonix    74-year-old gentleman, retired , remote smoker here for follow-up of some underlying emphysema, exertional dyspnea and GERD with stricture requiring dilation, Crandall's.  Reflux symptoms are well-controlled on his current regimen.  He has no dysphagia.  He continues to endorse exertional dyspnea.  With an FEV1 of 55% he should be able to walk without severe dyspnea.  He does not have underlying interstitial lung disease.  I am verbally told that his ejection fraction is normal.  He did develop some new onset atrial fibrillation which could drop his cardiac output and contribute to dyspnea.  However I think his biggest issue is deconditioning and obesity.  He had a Prevnar 20 in July 2024 and a flu shot in September 2024 as well as a COVID-vaccine.  He does need a Tdap but we do not give those in our office.      Living will no resuscitation if arrests.       Continue Trelegy 200 mcg  1 puff daily, sample  given    Uses DuoNeb as needed    Rescue inhaler when he is away from home    Continue Singulair    requesting a refill on Tussionex, last filled May 6, 2024    -See if primary care can give him a Tdap I have his last vaccine in 2010      Follow-up in 6 months with spirometry        Sirisha Sol MD

## 2024-12-09 NOTE — PROGRESS NOTES
Tanvir Chance is a 74 y.o. male here for evaluation of   Chief Complaint   Patient presents with    Shortness of Breath       Problem list:  Dyspnea on exertion  Moderate COPD  COVID 2022,10/24  Crandall's esophagus/stricture  Atrial fibrillation, October 2024  Hypertension  BPH  Allergic rhinitis  Arthritis  Hemorrhoids  Diverticulosis  [employer] [place], hearing aids  Dental implants  Colonoscopy, 2019, no polyps  EGD 8/4/21 dilation, EGD October 16, 2024, Crandall's esophagus  Eye lid surgery  Remote tonsillectomy  Left wrist open reduction internal fixation  Bilateral inguinal hernias with multiple repairs bilaterally  Lumbar disc surgery, remote, without sequelae  Bilateral total knee replacement  Right shoulder rotator cuff repair  Remote tobacco, quitting in 1987 after 25 years  Adopted  No known drug allergies    History of Present Illness    74-year-old gentleman, remote smoker here for follow-up of COPD, GERD with stricture and Crandall's esophagitis, exertional dyspnea.  He was treated for bronchitis in April 2024 and treated with amoxicillin, prednisone.  He remains on Trelegy for maintenance inhaler.  He is not wheezing or coughing mucous.    He got COVID 1 month ago. Only had drainage. Took Paxlovid and steroids.     He had EGD 10/24. He was in atrial fibrillation, new onset. He had ECHO and nuclear stress test and they were normal. He is on metoprolol and Eliquis.          Review of Systems   Constitutional:  Negative for activity change.   HENT:  Positive for postnasal drip.    Respiratory:  Positive for shortness of breath. Negative for cough and wheezing.    Cardiovascular:  Positive for palpitations.         Current Outpatient Medications:     albuterol sulfate  (90 Base) MCG/ACT inhaler, Inhale 2 puffs Every 4 (Four) Hours As Needed for Wheezing., Disp: 18 g, Rfl: 11    amLODIPine (NORVASC) 5 MG tablet, Take 1 tablet by mouth Daily. for blood pressure., Disp: , Rfl:     Boswellia-Glucosamine-Vit D  (GLUCOSAMINE COMPLEX PO), Take 1,500 mg by mouth., Disp: , Rfl:     Eliquis 5 MG tablet tablet, Take 1 tablet by mouth 2 (Two) Times a Day., Disp: , Rfl:     Fluticasone-Umeclidin-Vilant (Trelegy Ellipta) 200-62.5-25 MCG/ACT inhaler, Inhale 1 puff Daily., Disp: 28 each, Rfl: 11    Hydrocod Terry-Chlorphe Terry ER (TUSSIONEX PENNKINETIC) 10-8 MG/5ML ER suspension, Take 5 mL by mouth Every 12 (Twelve) Hours As Needed for Cough., Disp: 180 mL, Rfl: 0    hydrocortisone (ANUSOL-HC) 2.5 % rectal cream, Insert  into the rectum Daily., Disp: , Rfl:     ibuprofen (ADVIL,MOTRIN) 400 MG tablet, Take 1 tablet by mouth Every 6 (Six) Hours As Needed for Mild Pain., Disp: , Rfl:     ipratropium-albuterol (DUO-NEB) 0.5-2.5 mg/3 ml nebulizer, Take 3 mL by nebulization 4 (Four) Times a Day., Disp: 120 mL, Rfl: 11    LISINOPRIL PO, Take 20 mg by mouth., Disp: , Rfl:     Loratadine 10 MG capsule, Take 1 capsule by mouth Daily., Disp: , Rfl:     methocarbamol (ROBAXIN) 750 MG tablet, Take 1 tablet by mouth 4 (Four) Times a Day As Needed for Muscle Spasms., Disp: , Rfl:     metoprolol tartrate (LOPRESSOR) 25 MG tablet, Take 1 tablet by mouth 2 (Two) Times a Day., Disp: , Rfl:     montelukast (SINGULAIR) 10 MG tablet, Take 1 tablet by mouth Every Evening., Disp: , Rfl:     MULTIPLE VITAMINS PO, Take  by mouth., Disp: , Rfl:     naproxen sodium (ALEVE) 220 MG tablet, Take 1 tablet by mouth As Needed for Mild Pain., Disp: , Rfl:     pantoprazole (PROTONIX) 40 MG EC tablet, Take 1 tablet by mouth Daily., Disp: 30 tablet, Rfl: 11    polyethylene glycol (MIRALAX) packet, Take 17 g by mouth Every Night., Disp: , Rfl:     tadalafil (CIALIS) 10 MG tablet, Take 1 tablet by mouth Daily As Needed for Erectile Dysfunction., Disp: , Rfl:     vardenafil (LEVITRA) 10 MG tablet, Take 1 tablet by mouth Daily., Disp: , Rfl:     HYDROcodone-acetaminophen (NORCO)  MG per tablet, Take 1 tablet by mouth Every 6 (Six) Hours As Needed for Moderate Pain.  (Patient not taking: Reported on 2024), Disp: , Rfl:     Multiple Vitamins-Minerals (MULTIVITAMIN ADULT PO), Take 1 tablet by mouth Daily. (Patient not taking: Reported on 2024), Disp: , Rfl:     tadalafil (CIALIS) 10 MG tablet, Take 1 tablet by mouth Daily As Needed for Erectile Dysfunction. (Patient not taking: Reported on 10/2/2023), Disp: , Rfl:     Past Medical History:   Diagnosis Date    Allergic rhinitis     Arthritis     BPH (benign prostatic hyperplasia)     Cancer     Dental bridge present     Diverticulosis     H/O rotator cuff tear     Hemorrhoids     Hypertension     Inguinal hernia 5/10/2017    Osteoarthritis     Pneumonia     Wears glasses      Past Surgical History:   Procedure Laterality Date    BACK SURGERY      mid     COLONOSCOPY      DENTAL PROCEDURE      implants    DENTAL PROCEDURE  2017    FRACTURE SURGERY      HERNIA REPAIR      INGUINAL HERNIA REPAIR Right 5/10/2017    Procedure: INGUINAL HERNIA REPAIR RIGHT, UMBILICAL HERNIA REPAIR;  Surgeon: Ronni BENAVIDES MD;  Location: [facility];  Service:     INGUINAL HERNIA REPAIR Left     age 2yrs    JOINT REPLACEMENT      bilateral knees    REPLACEMENT TOTAL KNEE BILATERAL      SHOULDER SURGERY      rotator cuff    TONSILLECTOMY      TONSILLECTOMY AND ADENOIDECTOMY      Knee    VASECTOMY      WRIST SURGERY Left     orif     Social History     Socioeconomic History    Marital status:     Number of children: 2   Tobacco Use    Smoking status: Former     Current packs/day: 0.00     Average packs/day: 1 pack/day for 25.0 years (25.0 ttl pk-yrs)     Types: Cigarettes     Start date: 1962     Quit date: 1987     Years since quittin.6    Smokeless tobacco: Never   Vaping Use    Vaping status: Never Used   Substance and Sexual Activity    Alcohol use: No     Comment: OCC    Drug use: No    Sexual activity: Yes     Partners: Female     Birth control/protection: None     Family History   Adopted: Yes   Problem  "Relation Age of Onset    Lung cancer Mother      Blood pressure 134/82, pulse 102, temperature 98 °F (36.7 °C), temperature source Infrared, height 180.3 cm (70.98\"), weight 136 kg (299 lb), SpO2 92%.    Physical Exam  Constitutional:       General: He is not in acute distress.  HENT:      Head: Normocephalic.      Nose: Congestion present.      Mouth/Throat:      Pharynx: No oropharyngeal exudate.   Cardiovascular:      Rate and Rhythm: Normal rate. Rhythm irregular.      Heart sounds: No murmur heard.  Neurological:      Mental Status: He is alert.   Psychiatric:         Mood and Affect: Mood normal.           PFTs:    July 9, 2024, FVC 3.69 L, 64%, FEV1 2.03 L, 55% ratio 55%, moderate obstruction, no change compared to October 2023    10/2/23 6MWT no desaturation. Baseline 96%, walked 800ft, SaO2 96%    10/23 PFT FVC 3.91L 73%, after BD 3.91L 73%, FEV1 2.10L 53% after BD 2.37L 61%, ratio 54%    May 7, 2021, FVC 4.18 L, 91%, FEV1 1.98 L, 58% ratio 47%, residual volume 2.89 L, 108%, total lung capacity 7.07 L, 96%, diffusion capacity 17.6, 66%, moderate to severe obstruction, no restriction, moderate diffusion impairment    6-minute walk test, he was only able to walk at 1000 feet, saturation decreased to 90%, baseline 98%      Radiology:    Chest x-ray July 9, 2004 lungs are hyperinflated, no acute infiltrate heart is not enlarged, no effusion or edema    XR CHEST PA AND LATERAL     Date of Exam: 6/26/2023 11:49 AM EDT     Indication: Cough     Comparison: 5/7/2021.     Findings:  The heart size is normal. The pulmonary vascular markings are normal. There is stable scarring in the lung bases. There is no airspace disease, pleural effusion, or pneumothorax. There is degenerative spondylosis within the thoracic spine.     IMPRESSION:  Impression:     1. Stable scarring in the lung bases.  2. No superimposed active pulmonary disease.        Electronically Signed: Zander Judge    6/27/2023 1:56 PM EDT    COMPARISON: " None.     FINDINGS: Thyroid is homogeneous in attenuation. No bulky mediastinal  adenopathy with calcified right paratracheal and subcarinal lymph nodes  of prior healed granulomatous involvement. Central airways are patent.  Esophagus seen in normal course and caliber. Patent nonaneurysmal  thoracic aorta. Satisfactory opacification of the pulmonary arterial  tree and contrast bolus timing without filling defect to suggest  pulmonary embolus. Extended lung windows reveal central bronchiectasis  with moderate to severe upper lobe predominant centrilobular emphysema.  Subsegmental dependent atelectasis without dominant nodule or mass. No  pleural effusion. Degenerative changes of the thoracic spine without  aggressive osseous lesion.     IMPRESSION:  1. No PE.  2. Moderate to severe upper lobe predominant centrilobular emphysema  along with central bronchiectasis, however no focal consolidation or  effusion to suggest acute process.     D:  05/25/2021    COMPARISON: NONE     FINDINGS: Under fluoroscopic observation, patient ingested thin barium.  The oral phase of deglutition appeared normal. The esophageal mucosa  appeared grossly normal. There was mild esophageal dysmotility. There  was mild gastroesophageal reflux to the level of the midesophagus. There  is mild smooth luminal narrowing of the distal esophagus around the  level of the gastroesophageal junction. Luminal narrowing measures  approximately 7 mm in diameter, and may represent stricture, or spasm.  Further evaluation such as endoscopy may be considered if clinically  relevant. Examination of the stomach demonstrated a small sized  sliding-type hiatal hernia. The gastric folds and gastric mucosa  appeared grossly normal. There was no evidence of a gastric or duodenal  ulcer. There was no delay in gastric emptying. The duodenal bulb and  duodenal C-loop appeared grossly normal.        IMPRESSION:  1. Mild esophageal dysmotility  2. Mild gastroesophageal  reflux to the level of the midesophagus  3. Mild smooth luminal narrowing of the distal esophagus, which may  represent stricture, or spasm. Please consider endoscopy if clinically  relevant  4. Small sized sliding-type hiatal hernia         This report was finalized on 5/26/2021 9:59 PM by Dr. Carlos Mabry MD.     Interpretation Summary    Estimated left ventricular EF = 60%  The cardiac valves are anatomically and functionally normal.  Saline test results are negative.         Lab:    April 7, 2023, white count 6.7, hemoglobin 16, platelet count 195, 73% polys, 15% lymphs, 2% eosinophils, glucose 174, BUN 19, creatinine 1.05, sodium 142, potassium 4.6, chloride 103, bicarbonate 23, calcium 8.7, total protein 6.4, albumin 4.1, bilirubin 0.8, alk phos 79, AST 27, ALT 27, free T41.42, TSH 3.56    May 7, 2021 sedimentation rate 15, rheumatoid factor less than 10, BATOOL negative, white count 6.4, hemoglobin 16.5, 67 polys, 18 lymphocytes, 2.7% eosinophils, absolute eosinophil count 170.  Allergy zone 8 -, glucose 115, BUN 12, creatinine 0.89, sodium 142, potassium 4.6, chloride 105, bicarbonate 25.4    November 9, 2021, esophageal biopsy consistent with Crandall's      ,Diagnoses and all orders for this visit:    1. Centrilobular emphysema (Primary)    2. WILSON (dyspnea on exertion)    3. Gastroesophageal reflux disease with esophagitis without hemorrhage    4. Cough, unspecified type  -     Hydrocod Terry-Chlorphe Terry ER (TUSSIONEX PENNKINETIC) 10-8 MG/5ML ER suspension; Take 5 mL by mouth Every 12 (Twelve) Hours As Needed for Cough.  Dispense: 180 mL; Refill: 0            Discussion:     Emphysema  25 pack years, quit 1987  Chemical exposure as  with inhalation injury  May 2021, FEV1 2.10 L 53%  October 2023, FEV1 2.10 L, 53%, pre-, 2.37 L, 60% post  July 9, 2024, FEV1 2.03 L, 55%  6MWT no desaturation, 2023  Exacerbation 4/24 steroids,ATBX  Tussionex PRN cough    Dyspnea on exertion  Obesity (299  pounds)  Echocardiogram with EF 60%  6-minute walk test without desaturation, 2023  CTA, June 2021, no PE, calcified granulomatous changes, central bronchiectasis and upper lobe predominant emphysema  No regular exercise, significant deconditioning  New onset atrial fibrillation may be playing a role    GERD  EGD with dilation, July 2021, biopsy positive for Crandall's  EGD 10/16/24  Good symptom control on Protonix    74-year-old gentleman, retired , remote smoker here for follow-up of some underlying emphysema, exertional dyspnea and GERD with stricture requiring dilation, Crandall's.  Reflux symptoms are well-controlled on his current regimen.  He has no dysphagia.  He continues to endorse exertional dyspnea.  With an FEV1 of 55% he should be able to walk without severe dyspnea.  He does not have underlying interstitial lung disease.  I am verbally told that his ejection fraction is normal.  He did develop some new onset atrial fibrillation which could drop his cardiac output and contribute to dyspnea.  However I think his biggest issue is deconditioning and obesity.  He had a Prevnar 20 in July 2024 and a flu shot in September 2024 as well as a COVID-vaccine.  He does need a Tdap but we do not give those in our office.      Living will no resuscitation if arrests.       Continue Trelegy 200 mcg  1 puff daily, sample given    Uses DuoNeb as needed    Rescue inhaler when he is away from home    Continue Singulair    requesting a refill on Tussionex, last filled May 6, 2024    -See if primary care can give him a Tdap I have his last vaccine in 2010      Follow-up in 6 months with spirometry        Sirisha Sol MD

## 2025-01-08 ENCOUNTER — HOSPITAL ENCOUNTER (OUTPATIENT)
Dept: CARDIOLOGY | Facility: HOSPITAL | Age: 75
Discharge: HOME | End: 2025-01-08
Attending: INTERNAL MEDICINE | Admitting: INTERNAL MEDICINE
Payer: MEDICARE

## 2025-01-08 VITALS
BODY MASS INDEX: 35.9 KG/M2 | TEMPERATURE: 97.2 F | DIASTOLIC BLOOD PRESSURE: 78 MMHG | RESPIRATION RATE: 18 BRPM | WEIGHT: 294.8 LBS | SYSTOLIC BLOOD PRESSURE: 121 MMHG | OXYGEN SATURATION: 91 % | HEART RATE: 61 BPM | HEIGHT: 76 IN

## 2025-01-08 DIAGNOSIS — I48.91 ATRIAL FIB/FLUTTER, TRANSIENT: ICD-10-CM

## 2025-01-08 DIAGNOSIS — I48.92 ATRIAL FIB/FLUTTER, TRANSIENT: ICD-10-CM

## 2025-01-08 LAB
ANION GAP SERPL CALCULATED.3IONS-SCNC: 10 MMOL/L (ref 5–15)
BUN SERPL-MCNC: 11 MG/DL (ref 8–23)
BUN/CREAT SERPL: 11.6 (ref 7–25)
CALCIUM SPEC-SCNC: 9.1 MG/DL (ref 8.6–10.5)
CHLORIDE SERPL-SCNC: 101 MMOL/L (ref 98–107)
CO2 SERPL-SCNC: 26 MMOL/L (ref 22–29)
CREAT SERPL-MCNC: 0.95 MG/DL (ref 0.76–1.27)
DEPRECATED RDW RBC AUTO: 45.3 FL (ref 37–54)
EGFRCR SERPLBLD CKD-EPI 2021: 84 ML/MIN/1.73
ERYTHROCYTE [DISTWIDTH] IN BLOOD BY AUTOMATED COUNT: 13.8 % (ref 12.3–15.4)
GLUCOSE SERPL-MCNC: 342 MG/DL (ref 65–99)
HCT VFR BLD AUTO: 47.6 % (ref 37.5–51)
HGB BLD-MCNC: 15.8 G/DL (ref 13–17.7)
MCH RBC QN AUTO: 29.9 PG (ref 26.6–33)
MCHC RBC AUTO-ENTMCNC: 33.2 G/DL (ref 31.5–35.7)
MCV RBC AUTO: 90 FL (ref 79–97)
PLATELET # BLD AUTO: 200 10*3/MM3 (ref 140–450)
PMV BLD AUTO: 10.1 FL (ref 6–12)
POTASSIUM SERPL-SCNC: 4.3 MMOL/L (ref 3.5–5.2)
RBC # BLD AUTO: 5.29 10*6/MM3 (ref 4.14–5.8)
SODIUM SERPL-SCNC: 137 MMOL/L (ref 136–145)
WBC NRBC COR # BLD AUTO: 6.06 10*3/MM3 (ref 3.4–10.8)

## 2025-01-08 PROCEDURE — 36415 COLL VENOUS BLD VENIPUNCTURE: CPT

## 2025-01-08 PROCEDURE — 92960 CARDIOVERSION ELECTRIC EXT: CPT

## 2025-01-08 PROCEDURE — 93005 ELECTROCARDIOGRAM TRACING: CPT | Performed by: INTERNAL MEDICINE

## 2025-01-08 PROCEDURE — 80048 BASIC METABOLIC PNL TOTAL CA: CPT | Performed by: INTERNAL MEDICINE

## 2025-01-08 PROCEDURE — 85027 COMPLETE CBC AUTOMATED: CPT | Performed by: INTERNAL MEDICINE

## 2025-01-08 RX ORDER — ETOMIDATE 2 MG/ML
0.05 INJECTION INTRAVENOUS ONCE
Status: DISCONTINUED | OUTPATIENT
Start: 2025-01-08 | End: 2025-01-08 | Stop reason: HOSPADM

## 2025-01-08 RX ORDER — SOTALOL HYDROCHLORIDE 80 MG/1
80 TABLET ORAL EVERY 12 HOURS SCHEDULED
Status: DISCONTINUED | OUTPATIENT
Start: 2025-01-08 | End: 2025-01-08 | Stop reason: HOSPADM

## 2025-01-08 RX ORDER — ETOMIDATE 2 MG/ML
INJECTION INTRAVENOUS
Status: COMPLETED | OUTPATIENT
Start: 2025-01-08 | End: 2025-01-08

## 2025-01-08 RX ORDER — FENTANYL CITRATE 50 UG/ML
50-100 INJECTION, SOLUTION INTRAMUSCULAR; INTRAVENOUS ONCE AS NEEDED
Status: DISCONTINUED | OUTPATIENT
Start: 2025-01-08 | End: 2025-01-08

## 2025-01-08 RX ORDER — NALOXONE HCL 0.4 MG/ML
0.4 VIAL (ML) INJECTION ONCE AS NEEDED
Status: DISCONTINUED | OUTPATIENT
Start: 2025-01-08 | End: 2025-01-08 | Stop reason: HOSPADM

## 2025-01-08 RX ORDER — TADALAFIL 10 MG/1
10 TABLET ORAL DAILY
COMMUNITY

## 2025-01-08 RX ORDER — MIDAZOLAM HYDROCHLORIDE 1 MG/ML
2-20 INJECTION, SOLUTION INTRAMUSCULAR; INTRAVENOUS ONCE AS NEEDED
Status: DISCONTINUED | OUTPATIENT
Start: 2025-01-08 | End: 2025-01-08 | Stop reason: HOSPADM

## 2025-01-08 RX ORDER — ETOMIDATE 2 MG/ML
0.1 INJECTION INTRAVENOUS ONCE
Status: DISCONTINUED | OUTPATIENT
Start: 2025-01-08 | End: 2025-01-08 | Stop reason: HOSPADM

## 2025-01-08 RX ORDER — FLUMAZENIL 0.1 MG/ML
0.5 INJECTION INTRAVENOUS ONCE AS NEEDED
Status: DISCONTINUED | OUTPATIENT
Start: 2025-01-08 | End: 2025-01-08 | Stop reason: HOSPADM

## 2025-01-08 RX ORDER — FENTANYL CITRATE 50 UG/ML
50-200 INJECTION, SOLUTION INTRAMUSCULAR; INTRAVENOUS ONCE AS NEEDED
Status: DISCONTINUED | OUTPATIENT
Start: 2025-01-08 | End: 2025-01-08 | Stop reason: HOSPADM

## 2025-01-08 RX ORDER — MIDAZOLAM HYDROCHLORIDE 1 MG/ML
2-12 INJECTION, SOLUTION INTRAMUSCULAR; INTRAVENOUS ONCE AS NEEDED
Status: DISCONTINUED | OUTPATIENT
Start: 2025-01-08 | End: 2025-01-08

## 2025-01-08 RX ADMIN — SOTALOL HYDROCHLORIDE 80 MG: 80 TABLET ORAL at 08:20

## 2025-01-08 RX ADMIN — ETOMIDATE 13.4 MG: 40 INJECTION, SOLUTION INTRAVENOUS at 07:44

## 2025-01-08 NOTE — H&P
[facility] - History & Physical     Tanvir Chance  1950  2506/1      01/08/25      Identification:  Tanvir Chance is a 74 y.o. male.      PCP:  Mark Jones DO        Chief Complaint: AFib      Allergies:  has No Known Allergies.    Medications Prior to Admission   Medication Sig Dispense Refill Last Dose/Taking    albuterol sulfate  (90 Base) MCG/ACT inhaler Inhale 2 puffs Every 4 (Four) Hours As Needed for Wheezing. 18 g 11 1/7/2025    amLODIPine (NORVASC) 5 MG tablet Take 1 tablet by mouth Daily. for blood pressure.   1/8/2025 Morning    Eliquis 5 MG tablet tablet Take 1 tablet by mouth 2 (Two) Times a Day.   1/8/2025 Morning    Fluticasone-Umeclidin-Vilant (Trelegy Ellipta) 200-62.5-25 MCG/ACT inhaler Inhale 1 puff Daily. 28 each 11 1/8/2025 Morning    hydrocortisone (ANUSOL-HC) 2.5 % rectal cream Insert  into the rectum Daily.   Past Month    ibuprofen (ADVIL,MOTRIN) 400 MG tablet Take 1 tablet by mouth Every 6 (Six) Hours As Needed for Mild Pain.   Past Month    Loratadine 10 MG capsule Take 1 capsule by mouth Daily.   1/8/2025 Morning    metoprolol tartrate (LOPRESSOR) 25 MG tablet Take 2 tablets by mouth 2 (Two) Times a Day.   1/8/2025 Morning    MULTIPLE VITAMINS PO Take 1 tablet by mouth Daily.   1/8/2025 Morning    naproxen sodium (ALEVE) 220 MG tablet Take 1 tablet by mouth As Needed for Mild Pain.   Past Week    pantoprazole (PROTONIX) 40 MG EC tablet Take 1 tablet by mouth Daily. 30 tablet 11 1/8/2025 Morning    polyethylene glycol (MIRALAX) packet Take 17 g by mouth Every Night.   1/7/2025    tadalafil (CIALIS) 10 MG tablet Take 1 tablet by mouth Daily.   1/8/2025 Morning    Hydrocod Terry-Chlorphe Terry ER (TUSSIONEX PENNKINETIC) 10-8 MG/5ML ER suspension Take 5 mL by mouth Every 12 (Twelve) Hours As Needed for Cough. 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  He presents today to undergo elective ECV  under the care of Dr. Fraire. NOAC therapy uninterrupted.            Social History     Socioeconomic History    Marital status:     Number of children: 2   Tobacco Use    Smoking status: Former     Current packs/day: 0.00     Average packs/day: 1 pack/day for 25.0 years (25.0 ttl pk-yrs)     Types: Cigarettes     Start date: 1962     Quit date: 1987     Years since quittin.6    Smokeless tobacco: Never   Vaping Use    Vaping status: Never Used   Substance and Sexual Activity    Alcohol use: No     Comment: OCC    Drug use: No    Sexual activity: Yes     Partners: Female     Birth control/protection: None     Family History   Adopted: Yes   Problem Relation Age of Onset    Lung cancer Mother      Past Surgical History:   Procedure Laterality Date    BACK SURGERY      mid 30's    CARDIOVERSION      2025 PER DR. FRAIRE    COLONOSCOPY  2019    DENTAL PROCEDURE      implants    DENTAL PROCEDURE  2017    FRACTURE SURGERY      HERNIA REPAIR      INGUINAL HERNIA REPAIR Right 05/10/2017    Procedure: INGUINAL HERNIA REPAIR RIGHT, UMBILICAL HERNIA REPAIR;  Surgeon: Ronni BENAVIDES MD;  Location: [facility];  Service:     INGUINAL HERNIA REPAIR Left     age 2yrs    JOINT REPLACEMENT      bilateral knees    REPLACEMENT TOTAL KNEE BILATERAL      SHOULDER SURGERY      rotator cuff    TONSILLECTOMY      TONSILLECTOMY AND ADENOIDECTOMY      Knee    VASECTOMY      WRIST SURGERY Left     orif       Review of Systems:  Pertinent positives are listed above and in physical exam.  All others have been reviewed and are negative.     Objective:   Vitals:   height is 193 cm (76\") and weight is 134 kg (294 lb 12.8 oz). His temporal temperature is 97.2 °F (36.2 °C). His blood pressure is 150/135 (abnormal) and his pulse is 79. His " "respiration is 18 and oxygen saturation is 91%.  No intake or output data in the 24 hours ending 01/08/25 0749      Physical Exam:  General Appearance:    Alert, cooperative, in no acute distress   Head:    Normocephalic, without obvious abnormality, atraumatic   Eyes:            Lids and lashes normal, conjunctivae and sclerae normal, no   icterus, no pallor, corneas clear, PERRLA   Ears:    Ears appear intact with no abnormalities noted   Throat:   No oral lesions, no thrush, oral mucosa moist   Neck:   No adenopathy, supple, trachea midline, no thyromegaly, no carotid bruit, no JVD   Back:     No kyphosis present, no scoliosis present, no skin lesions,   erythema or scars, no tenderness to percussion or                  palpation,  range of motion normal   Lungs:     Clear to auscultation,respirations regular, even and               unlabored    Heart:    Irregular rhythm and normal rate, normal S1 and S2, no         murmur, no gallop, no rub, no click   Abdomen:     Normal bowel sounds, no masses, no organomegaly, soft     nontender, nondistended, no guarding, no rebound               tenderness   Extremities:   Moves all extremities well,  no cyanosis, no redness, no edema   Pulses:   Pulses palpable and equal bilaterally   Skin:   No bleeding, bruising or rash   Lymph nodes:   No palpable adenopathy   Neurologic:   Cranial nerves 2 - 12 grossly intact, sensation intact, DTR    present and equal bilaterally          Results Review:  I personally reviewed the patient's clinical results.    Results from last 7 days   Lab Units 01/08/25  0718   WBC 10*3/mm3 6.06   HEMOGLOBIN g/dL 15.8   HEMATOCRIT % 47.6   PLATELETS 10*3/mm3 200           Invalid input(s): \"LABALBU\", \"PROT\"                        Radiology:  Imaging Results (Last 72 Hours)       ** No results found for the last 72 hours. **            Tele:  AFib    Assessment and Plan:    1.  75 yo CM with persistent AFib presents today for elective ECV.  Risk " and benefits have been reviewed with the patient he is willing to proceed.  Further recommendations based on outcomes.        I discussed the patient's findings and my recommendations with the patient, any present family members, and the nursing staff.  Jesse Fraire MD saw and examined patient, verified hx and PE, read all radiographic studies, reviewed labs and micro data, and formulated dx, plan for treatment and all medical decision making.      Lala Guadarrama PA-C for Jesse Fraire MD  01/08/25 07:49 EST

## 2025-01-10 LAB
QT INTERVAL: 440 MS
QTC INTERVAL: 440 MS

## 2025-05-19 NOTE — PROGRESS NOTES
Tanvir Chance is a 74 y.o. male here for evaluation of   Chief Complaint   Patient presents with    Centrilobular Emphysema     F/u       Problem list:  Dyspnea on exertion  Moderate COPD  COVID 2022,10/24  Crandall's esophagus/stricture  Atrial fibrillation, October 2024  Electrical cardioversion January 8, 2025  Hypertension  BPH  Allergic rhinitis  Arthritis  Hemorrhoids  Diverticulosis  [place], hearing aids  Dental implants  Colonoscopy, 2019, no polyps  EGD 8/4/21 dilation, EGD October 16, 2024, Crandall's esophagus  Eye lid surgery  Remote tonsillectomy  Left wrist open reduction internal fixation  Bilateral inguinal hernias with multiple repairs bilaterally  Lumbar disc surgery, remote, without sequelae  Bilateral total knee replacement  Right shoulder rotator cuff repair  Remote tobacco, quitting in 1987 after 25 years  Adopted  No known drug allergies    History of Present Illness    74-year-old gentleman, with GERD with stricture and Crandall's esophagitis, HTN, BPH, remote smoker here for follow-up of COPD,  exertional dyspnea.  He developed some paroxysmal atrial fibrillation in 2024 and had an echocardiogram, nuclear stress test that were normal.  He was placed on Eliquis and metoprolol.  January 8, 2025 he underwent electrical cardioversion and was able to sustain sinus rhythm.  He was discharged on sotalol, Eliquis.      Scheduled for cataract surgery next month. Currently denies cough or wheezing. Wife reports better oxygen tolerance. Has dieted and lost 41 pounds.       Review of Systems   Constitutional:  Negative for activity change.   HENT:  Positive for postnasal drip.    Respiratory:  Positive for shortness of breath. Negative for cough and wheezing.    Cardiovascular:  Positive for palpitations.         Current Outpatient Medications:     albuterol sulfate  (90 Base) MCG/ACT inhaler, Inhale 2 puffs Every 4 (Four) Hours As Needed for Wheezing., Disp: 18 g, Rfl: 11    amLODIPine (NORVASC) 5  MG tablet, Take 1 tablet by mouth Daily. for blood pressure., Disp: , Rfl:     Eliquis 5 MG tablet tablet, Take 1 tablet by mouth 2 (Two) Times a Day., Disp: , Rfl:     Fluticasone-Umeclidin-Vilant (Trelegy Ellipta) 200-62.5-25 MCG/ACT inhaler, Inhale 1 puff Daily., Disp: 28 each, Rfl: 11    Hydrocod Terry-Chlorphe Terry ER (TUSSIONEX PENNKINETIC) 10-8 MG/5ML ER suspension, Take 5 mL by mouth Every 12 (Twelve) Hours As Needed for Cough., Disp: 180 mL, Rfl: 0    hydrocortisone (ANUSOL-HC) 2.5 % rectal cream, Insert  into the rectum Daily., Disp: , Rfl:     ibuprofen (ADVIL,MOTRIN) 400 MG tablet, Take 1 tablet by mouth Every 6 (Six) Hours As Needed for Mild Pain., Disp: , Rfl:     Loratadine 10 MG capsule, Take 1 capsule by mouth Daily., Disp: , Rfl:     metFORMIN (GLUCOPHAGE) 500 MG tablet, Take 1 tablet by mouth 2 (Two) Times a Day., Disp: , Rfl:     montelukast (SINGULAIR) 10 MG tablet, Take 1 tablet by mouth Daily., Disp: , Rfl:     Mounjaro 7.5 MG/0.5ML solution auto-injector, Inject 7.5 mg by subcutaneous route., Disp: , Rfl:     MULTIPLE VITAMINS PO, Take 1 tablet by mouth Daily., Disp: , Rfl:     naproxen sodium (ALEVE) 220 MG tablet, Take 1 tablet by mouth As Needed for Mild Pain., Disp: , Rfl:     pantoprazole (PROTONIX) 40 MG EC tablet, Take 1 tablet by mouth Daily., Disp: 30 tablet, Rfl: 11    polyethylene glycol (MIRALAX) packet, Take 17 g by mouth Every Night., Disp: , Rfl:     Sotalol HCl AF 80 MG tablet, Take 1 tablet by mouth 2 (Two) Times a Day., Disp: 60 tablet, Rfl: 11    tadalafil (CIALIS) 10 MG tablet, Take 1 tablet by mouth Daily., Disp: , Rfl:     Past Medical History:   Diagnosis Date    Allergic rhinitis     Arthritis     BPH (benign prostatic hyperplasia)     Cancer     Dental bridge present     Diverticulosis     H/O rotator cuff tear     Hemorrhoids     Hypertension     Inguinal hernia 5/10/2017    Osteoarthritis     Pneumonia     Wears glasses      Past Surgical History:   Procedure  "Laterality Date    BACK SURGERY      mid 30's    CARDIOVERSION      2025 PER DR. COX    COLONOSCOPY  2019    DENTAL PROCEDURE      implants    DENTAL PROCEDURE  2017    FRACTURE SURGERY      HERNIA REPAIR      INGUINAL HERNIA REPAIR Right 05/10/2017    Procedure: INGUINAL HERNIA REPAIR RIGHT, UMBILICAL HERNIA REPAIR;  Surgeon: Ronni BENAVIDES MD;  Location: [facility];  Service:     INGUINAL HERNIA REPAIR Left     age 2yrs    JOINT REPLACEMENT      bilateral knees    REPLACEMENT TOTAL KNEE BILATERAL      SHOULDER SURGERY      rotator cuff    TONSILLECTOMY      TONSILLECTOMY AND ADENOIDECTOMY      Knee    VASECTOMY      WRIST SURGERY Left     orif     Social History     Socioeconomic History    Marital status:     Number of children: 2   Tobacco Use    Smoking status: Former     Current packs/day: 0.00     Average packs/day: 1 pack/day for 25.0 years (25.0 ttl pk-yrs)     Types: Cigarettes     Start date: 1962     Quit date: 1987     Years since quittin.0     Passive exposure: Past    Smokeless tobacco: Never   Vaping Use    Vaping status: Never Used   Substance and Sexual Activity    Alcohol use: No     Comment: OCC    Drug use: No    Sexual activity: Yes     Partners: Female     Birth control/protection: None     Family History   Adopted: Yes   Problem Relation Age of Onset    Lung cancer Mother      Blood pressure 120/64, pulse 67, temperature 97.8 °F (36.6 °C), temperature source Temporal, height 193 cm (75.98\"), weight 120 kg (264 lb), SpO2 94%.    Physical Exam  Constitutional:       General: He is not in acute distress.  HENT:      Head: Normocephalic.      Nose: Congestion present.      Mouth/Throat:      Pharynx: No oropharyngeal exudate.   Cardiovascular:      Rate and Rhythm: Normal rate. Rhythm irregular.      Heart sounds: No murmur heard.  Pulmonary:      Comments: Prolonged expiration, without wheezing  Musculoskeletal:      Right lower leg: No edema.      Left lower " leg: No edema.   Neurological:      Mental Status: He is alert.   Psychiatric:         Mood and Affect: Mood normal.         PFTs:    May 22, 2025, FVC 4.24 L, 95%, FEV1 2.37 L, 73%, ratio 56%    July 9, 2024, FVC 3.69 L, 64%, FEV1 2.03 L, 55% ratio 55%, moderate obstruction, no change compared to October 2023    10/2/23 6MWT no desaturation. Baseline 96%, walked 800ft, SaO2 96%    10/23 PFT FVC 3.91L 73%, after BD 3.91L 73%, FEV1 2.10L 53% after BD 2.37L 61%, ratio 54%    May 7, 2021, FVC 4.18 L, 91%, FEV1 1.98 L, 58% ratio 47%, residual volume 2.89 L, 108%, total lung capacity 7.07 L, 96%, diffusion capacity 17.6, 66%, moderate to severe obstruction, no restriction, moderate diffusion impairment    6-minute walk test, he was only able to walk at 1000 feet, saturation decreased to 90%, baseline 98%      Radiology:    Chest x-ray July 9, 2004 lungs are hyperinflated, no acute infiltrate heart is not enlarged, no effusion or edema    XR CHEST PA AND LATERAL     Date of Exam: 6/26/2023 11:49 AM EDT     Indication: Cough     Comparison: 5/7/2021.     Findings:  The heart size is normal. The pulmonary vascular markings are normal. There is stable scarring in the lung bases. There is no airspace disease, pleural effusion, or pneumothorax. There is degenerative spondylosis within the thoracic spine.     IMPRESSION:  Impression:     1. Stable scarring in the lung bases.  2. No superimposed active pulmonary disease.        Electronically Signed: Zander Judge    6/27/2023 1:56 PM EDT    COMPARISON: None.     FINDINGS: Thyroid is homogeneous in attenuation. No bulky mediastinal  adenopathy with calcified right paratracheal and subcarinal lymph nodes  of prior healed granulomatous involvement. Central airways are patent.  Esophagus seen in normal course and caliber. Patent nonaneurysmal  thoracic aorta. Satisfactory opacification of the pulmonary arterial  tree and contrast bolus timing without filling defect to  suggest  pulmonary embolus. Extended lung windows reveal central bronchiectasis  with moderate to severe upper lobe predominant centrilobular emphysema.  Subsegmental dependent atelectasis without dominant nodule or mass. No  pleural effusion. Degenerative changes of the thoracic spine without  aggressive osseous lesion.     IMPRESSION:  1. No PE.  2. Moderate to severe upper lobe predominant centrilobular emphysema  along with central bronchiectasis, however no focal consolidation or  effusion to suggest acute process.     D:  05/25/2021    COMPARISON: NONE     FINDINGS: Under fluoroscopic observation, patient ingested thin barium.  The oral phase of deglutition appeared normal. The esophageal mucosa  appeared grossly normal. There was mild esophageal dysmotility. There  was mild gastroesophageal reflux to the level of the midesophagus. There  is mild smooth luminal narrowing of the distal esophagus around the  level of the gastroesophageal junction. Luminal narrowing measures  approximately 7 mm in diameter, and may represent stricture, or spasm.  Further evaluation such as endoscopy may be considered if clinically  relevant. Examination of the stomach demonstrated a small sized  sliding-type hiatal hernia. The gastric folds and gastric mucosa  appeared grossly normal. There was no evidence of a gastric or duodenal  ulcer. There was no delay in gastric emptying. The duodenal bulb and  duodenal C-loop appeared grossly normal.        IMPRESSION:  1. Mild esophageal dysmotility  2. Mild gastroesophageal reflux to the level of the midesophagus  3. Mild smooth luminal narrowing of the distal esophagus, which may  represent stricture, or spasm. Please consider endoscopy if clinically  relevant  4. Small sized sliding-type hiatal hernia         This report was finalized on 5/26/2021 9:59 PM by Dr. Carlos Mabry MD.     Interpretation Summary    Estimated left ventricular EF = 60%  The cardiac valves are anatomically and  functionally normal.  Saline test results are negative.         Lab:    January 8, 2025, glucose 342, BUN 11, creatinine 0.95, sodium 137, potassium 4.3, chloride 101, bicarbonate 26, calcium 9.1, GFR 84, white count 6, hemoglobin 15.8, platelets 200    April 7, 2023, white count 6.7, hemoglobin 16, platelet count 195, 73% polys, 15% lymphs, 2% eosinophils, glucose 174, BUN 19, creatinine 1.05, sodium 142, potassium 4.6, chloride 103, bicarbonate 23, calcium 8.7, total protein 6.4, albumin 4.1, bilirubin 0.8, alk phos 79, AST 27, ALT 27, free T41.42, TSH 3.56    May 7, 2021 sedimentation rate 15, rheumatoid factor less than 10, BATOOL negative, white count 6.4, hemoglobin 16.5, 67 polys, 18 lymphocytes, 2.7% eosinophils, absolute eosinophil count 170.  Allergy zone 8 -, glucose 115, BUN 12, creatinine 0.89, sodium 142, potassium 4.6, chloride 105, bicarbonate 25.4    November 9, 2021, esophageal biopsy consistent with Crandall's      ,Diagnoses and all orders for this visit:    1. WILSON (dyspnea on exertion) (Primary)    2. Centrilobular emphysema              Discussion:     Emphysema  25 pack years, quit 1987  Chemical exposure as  with inhalation injury  May 2021, FEV1 2.10 L 53%  October 2023, FEV1 2.10 L, 53%, pre-, 2.37 L, 60% post  July 9, 2024, FEV1 2.03 L, 55%  May 22, 2025, FEV1 2.37 L, 73%  6MWT no desaturation, 2023  Exacerbation 4/24 steroids,ATBX  Tussionex PRN cough    Dyspnea on exertion  Obesity (299 pounds), lost weight now 264  Echocardiogram with EF 60%  6-minute walk test without desaturation, 2023  CTA, June 2021, no PE, calcified granulomatous changes, central bronchiectasis and upper lobe predominant emphysema  No regular exercise, significant deconditioning  New onset atrial fibrillation may be playing a role, ECV to SR 1/8/25      GERD  EGD with dilation, July 2021, biopsy positive for Crandall's  EGD 10/16/24  Good symptom control on Protonix    74-year-old gentleman, retired ,  remote smoker here for follow-up of some underlying emphysema, exertional dyspnea and GERD with stricture requiring dilation, Crandall's.  Reflux symptoms are well-controlled on his current regimen.  He has no dysphagia.    He continues to endorse exertional dyspnea.  With an FEV1 now 73%, compared to 55%, he should be able to walk without severe dyspnea.  He does not have underlying interstitial lung disease.  I am verbally told that his ejection fraction is normal.  He did develop some new onset atrial fibrillation last October.  January 8, 2025 he underwent electrical cardioversion.  He is clinically in sinus rhythm.  With no wheezing on examination and no evidence of congestive heart failure, 40 pound weight loss with dieting, I would expect his dyspnea to be improved.  This may represent deconditioning.      He had a Prevnar 20 in July 2024 and a flu shot in September 2024 as well as a COVID-vaccine.    Trelegy 200, 1 puff daily  Rescue inhaler as needed  Singular 10mg daily  Uses DuoNeb as needed  Needs TDAP    Regular walking 30 minutes 4 times a week    Living will no resuscitation if arrests.     6mos anna    Sirisha Sol MD

## 2025-05-22 ENCOUNTER — OFFICE VISIT (OUTPATIENT)
Dept: PULMONOLOGY | Facility: CLINIC | Age: 75
End: 2025-05-22
Payer: MEDICARE

## 2025-05-22 VITALS
TEMPERATURE: 97.8 F | OXYGEN SATURATION: 94 % | SYSTOLIC BLOOD PRESSURE: 120 MMHG | DIASTOLIC BLOOD PRESSURE: 64 MMHG | HEART RATE: 67 BPM | HEIGHT: 76 IN | WEIGHT: 264 LBS | BODY MASS INDEX: 32.15 KG/M2

## 2025-05-22 DIAGNOSIS — R06.09 DOE (DYSPNEA ON EXERTION): Primary | ICD-10-CM

## 2025-05-22 DIAGNOSIS — J43.2 CENTRILOBULAR EMPHYSEMA: ICD-10-CM

## 2025-05-22 RX ORDER — MONTELUKAST SODIUM 10 MG/1
1 TABLET ORAL DAILY
COMMUNITY
Start: 2025-04-24

## 2025-05-22 RX ORDER — TIRZEPATIDE 7.5 MG/.5ML
INJECTION, SOLUTION SUBCUTANEOUS
COMMUNITY
Start: 2025-05-16

## 2025-05-22 NOTE — LETTER
May 22, 2025     Mark Jones DO  [address] [facility] [address]    Patient: Tanvir Chance   YOB: 1950   Date of Visit: 5/22/2025     Dear Dr. Karen DO:    Thank you for referring Tanvir Chance to me for evaluation. Below are the relevant portions of my assessment and plan of care.    If you have questions, please do not hesitate to call me. I look forward to following Tanvir along with you.         Sincerely,        Sirisha Sol MD        CC: No Recipients      Progress Notes:  Tanvir Chance is a 74 y.o. male here for evaluation of   Chief Complaint   Patient presents with   • Centrilobular Emphysema     F/u       Problem list:  Dyspnea on exertion  Moderate COPD  COVID 2022,10/24  Crandall's esophagus/stricture  Atrial fibrillation, October 2024  Electrical cardioversion January 8, 2025  Hypertension  BPH  Allergic rhinitis  Arthritis  Hemorrhoids  Diverticulosis  [place], hearing aids  Dental implants  Colonoscopy, 2019, no polyps  EGD 8/4/21 dilation, EGD October 16, 2024, Crandall's esophagus  Eye lid surgery  Remote tonsillectomy  Left wrist open reduction internal fixation  Bilateral inguinal hernias with multiple repairs bilaterally  Lumbar disc surgery, remote, without sequelae  Bilateral total knee replacement  Right shoulder rotator cuff repair  Remote tobacco, quitting in 1987 after 25 years  Adopted  No known drug allergies    History of Present Illness    74-year-old gentleman, with GERD with stricture and Crandall's esophagitis, HTN, BPH, remote smoker here for follow-up of COPD,  exertional dyspnea.  He developed some paroxysmal atrial fibrillation in 2024 and had an echocardiogram, nuclear stress test that were normal.  He was placed on Eliquis and metoprolol.  January 8, 2025 he underwent electrical cardioversion and was able to sustain sinus rhythm.  He was discharged on sotalol, Eliquis.      Scheduled for cataract surgery next month. Currently denies  cough or wheezing. Wife reports better oxygen tolerance. Has dieted and lost 41 pounds.       Review of Systems   Constitutional:  Negative for activity change.   HENT:  Positive for postnasal drip.    Respiratory:  Positive for shortness of breath. Negative for cough and wheezing.    Cardiovascular:  Positive for palpitations.         Current Outpatient Medications:   •  albuterol sulfate  (90 Base) MCG/ACT inhaler, Inhale 2 puffs Every 4 (Four) Hours As Needed for Wheezing., Disp: 18 g, Rfl: 11  •  amLODIPine (NORVASC) 5 MG tablet, Take 1 tablet by mouth Daily. for blood pressure., Disp: , Rfl:   •  Eliquis 5 MG tablet tablet, Take 1 tablet by mouth 2 (Two) Times a Day., Disp: , Rfl:   •  Fluticasone-Umeclidin-Vilant (Trelegy Ellipta) 200-62.5-25 MCG/ACT inhaler, Inhale 1 puff Daily., Disp: 28 each, Rfl: 11  •  Hydrocod Terry-Chlorphe Terry ER (TUSSIONEX PENNKINETIC) 10-8 MG/5ML ER suspension, Take 5 mL by mouth Every 12 (Twelve) Hours As Needed for Cough., Disp: 180 mL, Rfl: 0  •  hydrocortisone (ANUSOL-HC) 2.5 % rectal cream, Insert  into the rectum Daily., Disp: , Rfl:   •  ibuprofen (ADVIL,MOTRIN) 400 MG tablet, Take 1 tablet by mouth Every 6 (Six) Hours As Needed for Mild Pain., Disp: , Rfl:   •  Loratadine 10 MG capsule, Take 1 capsule by mouth Daily., Disp: , Rfl:   •  metFORMIN (GLUCOPHAGE) 500 MG tablet, Take 1 tablet by mouth 2 (Two) Times a Day., Disp: , Rfl:   •  montelukast (SINGULAIR) 10 MG tablet, Take 1 tablet by mouth Daily., Disp: , Rfl:   •  Mounjaro 7.5 MG/0.5ML solution auto-injector, Inject 7.5 mg by subcutaneous route., Disp: , Rfl:   •  MULTIPLE VITAMINS PO, Take 1 tablet by mouth Daily., Disp: , Rfl:   •  naproxen sodium (ALEVE) 220 MG tablet, Take 1 tablet by mouth As Needed for Mild Pain., Disp: , Rfl:   •  pantoprazole (PROTONIX) 40 MG EC tablet, Take 1 tablet by mouth Daily., Disp: 30 tablet, Rfl: 11  •  polyethylene glycol (MIRALAX) packet, Take 17 g by mouth Every Night., Disp:  , Rfl:   •  Sotalol HCl AF 80 MG tablet, Take 1 tablet by mouth 2 (Two) Times a Day., Disp: 60 tablet, Rfl: 11  •  tadalafil (CIALIS) 10 MG tablet, Take 1 tablet by mouth Daily., Disp: , Rfl:     Past Medical History:   Diagnosis Date   • Allergic rhinitis    • Arthritis    • BPH (benign prostatic hyperplasia)    • Cancer    • Dental bridge present    • Diverticulosis    • H/O rotator cuff tear    • Hemorrhoids    • Hypertension    • Inguinal hernia 5/10/2017   • Osteoarthritis    • Pneumonia    • Wears glasses      Past Surgical History:   Procedure Laterality Date   • BACK SURGERY      mid 30's   • CARDIOVERSION      2025 PER DR. COX   • COLONOSCOPY  2019   • DENTAL PROCEDURE      implants   • DENTAL PROCEDURE  2017   • FRACTURE SURGERY     • HERNIA REPAIR     • INGUINAL HERNIA REPAIR Right 05/10/2017    Procedure: INGUINAL HERNIA REPAIR RIGHT, UMBILICAL HERNIA REPAIR;  Surgeon: Ronni BENAVIDES MD;  Location: [facility];  Service:    • INGUINAL HERNIA REPAIR Left     age 2yrs   • JOINT REPLACEMENT      bilateral knees   • REPLACEMENT TOTAL KNEE BILATERAL     • SHOULDER SURGERY      rotator cuff   • TONSILLECTOMY     • TONSILLECTOMY AND ADENOIDECTOMY      Knee   • VASECTOMY     • WRIST SURGERY Left     orif     Social History     Socioeconomic History   • Marital status:    • Number of children: 2   Tobacco Use   • Smoking status: Former     Current packs/day: 0.00     Average packs/day: 1 pack/day for 25.0 years (25.0 ttl pk-yrs)     Types: Cigarettes     Start date: 1962     Quit date: 1987     Years since quittin.0     Passive exposure: Past   • Smokeless tobacco: Never   Vaping Use   • Vaping status: Never Used   Substance and Sexual Activity   • Alcohol use: No     Comment: OCC   • Drug use: No   • Sexual activity: Yes     Partners: Female     Birth control/protection: None     Family History   Adopted: Yes   Problem Relation Age of Onset   • Lung cancer Mother      Blood  "pressure 120/64, pulse 67, temperature 97.8 °F (36.6 °C), temperature source Temporal, height 193 cm (75.98\"), weight 120 kg (264 lb), SpO2 94%.    Physical Exam  Constitutional:       General: He is not in acute distress.  HENT:      Head: Normocephalic.      Nose: Congestion present.      Mouth/Throat:      Pharynx: No oropharyngeal exudate.   Cardiovascular:      Rate and Rhythm: Normal rate. Rhythm irregular.      Heart sounds: No murmur heard.  Pulmonary:      Comments: Prolonged expiration, without wheezing  Musculoskeletal:      Right lower leg: No edema.      Left lower leg: No edema.   Neurological:      Mental Status: He is alert.   Psychiatric:         Mood and Affect: Mood normal.         PFTs:    May 22, 2025, FVC 4.24 L, 95%, FEV1 2.37 L, 73%, ratio 56%    July 9, 2024, FVC 3.69 L, 64%, FEV1 2.03 L, 55% ratio 55%, moderate obstruction, no change compared to October 2023    10/2/23 6MWT no desaturation. Baseline 96%, walked 800ft, SaO2 96%    10/23 PFT FVC 3.91L 73%, after BD 3.91L 73%, FEV1 2.10L 53% after BD 2.37L 61%, ratio 54%    May 7, 2021, FVC 4.18 L, 91%, FEV1 1.98 L, 58% ratio 47%, residual volume 2.89 L, 108%, total lung capacity 7.07 L, 96%, diffusion capacity 17.6, 66%, moderate to severe obstruction, no restriction, moderate diffusion impairment    6-minute walk test, he was only able to walk at 1000 feet, saturation decreased to 90%, baseline 98%      Radiology:    Chest x-ray July 9, 2004 lungs are hyperinflated, no acute infiltrate heart is not enlarged, no effusion or edema    XR CHEST PA AND LATERAL     Date of Exam: 6/26/2023 11:49 AM EDT     Indication: Cough     Comparison: 5/7/2021.     Findings:  The heart size is normal. The pulmonary vascular markings are normal. There is stable scarring in the lung bases. There is no airspace disease, pleural effusion, or pneumothorax. There is degenerative spondylosis within the thoracic spine.     IMPRESSION:  Impression:     1. Stable " scarring in the lung bases.  2. No superimposed active pulmonary disease.        Electronically Signed: Zander Alfie    6/27/2023 1:56 PM EDT    COMPARISON: None.     FINDINGS: Thyroid is homogeneous in attenuation. No bulky mediastinal  adenopathy with calcified right paratracheal and subcarinal lymph nodes  of prior healed granulomatous involvement. Central airways are patent.  Esophagus seen in normal course and caliber. Patent nonaneurysmal  thoracic aorta. Satisfactory opacification of the pulmonary arterial  tree and contrast bolus timing without filling defect to suggest  pulmonary embolus. Extended lung windows reveal central bronchiectasis  with moderate to severe upper lobe predominant centrilobular emphysema.  Subsegmental dependent atelectasis without dominant nodule or mass. No  pleural effusion. Degenerative changes of the thoracic spine without  aggressive osseous lesion.     IMPRESSION:  1. No PE.  2. Moderate to severe upper lobe predominant centrilobular emphysema  along with central bronchiectasis, however no focal consolidation or  effusion to suggest acute process.     D:  05/25/2021    COMPARISON: NONE     FINDINGS: Under fluoroscopic observation, patient ingested thin barium.  The oral phase of deglutition appeared normal. The esophageal mucosa  appeared grossly normal. There was mild esophageal dysmotility. There  was mild gastroesophageal reflux to the level of the midesophagus. There  is mild smooth luminal narrowing of the distal esophagus around the  level of the gastroesophageal junction. Luminal narrowing measures  approximately 7 mm in diameter, and may represent stricture, or spasm.  Further evaluation such as endoscopy may be considered if clinically  relevant. Examination of the stomach demonstrated a small sized  sliding-type hiatal hernia. The gastric folds and gastric mucosa  appeared grossly normal. There was no evidence of a gastric or duodenal  ulcer. There was no delay in  gastric emptying. The duodenal bulb and  duodenal C-loop appeared grossly normal.        IMPRESSION:  1. Mild esophageal dysmotility  2. Mild gastroesophageal reflux to the level of the midesophagus  3. Mild smooth luminal narrowing of the distal esophagus, which may  represent stricture, or spasm. Please consider endoscopy if clinically  relevant  4. Small sized sliding-type hiatal hernia         This report was finalized on 5/26/2021 9:59 PM by Dr. Carlos Mabry MD.     Interpretation Summary    Estimated left ventricular EF = 60%  The cardiac valves are anatomically and functionally normal.  Saline test results are negative.         Lab:    January 8, 2025, glucose 342, BUN 11, creatinine 0.95, sodium 137, potassium 4.3, chloride 101, bicarbonate 26, calcium 9.1, GFR 84, white count 6, hemoglobin 15.8, platelets 200    April 7, 2023, white count 6.7, hemoglobin 16, platelet count 195, 73% polys, 15% lymphs, 2% eosinophils, glucose 174, BUN 19, creatinine 1.05, sodium 142, potassium 4.6, chloride 103, bicarbonate 23, calcium 8.7, total protein 6.4, albumin 4.1, bilirubin 0.8, alk phos 79, AST 27, ALT 27, free T41.42, TSH 3.56    May 7, 2021 sedimentation rate 15, rheumatoid factor less than 10, BATOOL negative, white count 6.4, hemoglobin 16.5, 67 polys, 18 lymphocytes, 2.7% eosinophils, absolute eosinophil count 170.  Allergy zone 8 -, glucose 115, BUN 12, creatinine 0.89, sodium 142, potassium 4.6, chloride 105, bicarbonate 25.4    November 9, 2021, esophageal biopsy consistent with Crandall's      ,Diagnoses and all orders for this visit:    1. WILSON (dyspnea on exertion) (Primary)    2. Centrilobular emphysema              Discussion:     Emphysema  25 pack years, quit 1987  Chemical exposure as  with inhalation injury  May 2021, FEV1 2.10 L 53%  October 2023, FEV1 2.10 L, 53%, pre-, 2.37 L, 60% post  July 9, 2024, FEV1 2.03 L, 55%  May 22, 2025, FEV1 2.37 L, 73%  6MWT no desaturation, 2023  Exacerbation 4/24  steroids,ATBX  Tussionex PRN cough    Dyspnea on exertion  Obesity (299 pounds), lost weight now 264  Echocardiogram with EF 60%  6-minute walk test without desaturation, 2023  CTA, June 2021, no PE, calcified granulomatous changes, central bronchiectasis and upper lobe predominant emphysema  No regular exercise, significant deconditioning  New onset atrial fibrillation may be playing a role, ECV to SR 1/8/25      GERD  EGD with dilation, July 2021, biopsy positive for Crandall's  EGD 10/16/24  Good symptom control on Protonix    74-year-old gentleman, retired , remote smoker here for follow-up of some underlying emphysema, exertional dyspnea and GERD with stricture requiring dilation, Crandall's.  Reflux symptoms are well-controlled on his current regimen.  He has no dysphagia.    He continues to endorse exertional dyspnea.  With an FEV1 now 73%, compared to 55%, he should be able to walk without severe dyspnea.  He does not have underlying interstitial lung disease.  I am verbally told that his ejection fraction is normal.  He did develop some new onset atrial fibrillation last October.  January 8, 2025 he underwent electrical cardioversion.  He is clinically in sinus rhythm.  With no wheezing on examination and no evidence of congestive heart failure, 40 pound weight loss with dieting, I would expect his dyspnea to be improved.  This may represent deconditioning.      He had a Prevnar 20 in July 2024 and a flu shot in September 2024 as well as a COVID-vaccine.    Trelegy 200, 1 puff daily  Rescue inhaler as needed  Singular 10mg daily  Uses DuoNeb as needed  Needs TDAP    Regular walking 30 minutes 4 times a week    Living will no resuscitation if arrests.     6mos anna    Sirisha Sol MD

## 2025-08-06 RX ORDER — FLUTICASONE FUROATE, UMECLIDINIUM BROMIDE AND VILANTEROL TRIFENATATE 200; 62.5; 25 UG/1; UG/1; UG/1
1 POWDER RESPIRATORY (INHALATION) DAILY
Qty: 60 EACH | Refills: 2 | Status: SHIPPED | OUTPATIENT
Start: 2025-08-06

## (undated) DEVICE — DRN PENRS 1/2X18IN LTX

## (undated) DEVICE — ANTIBACTERIAL UNDYED BRAIDED (POLYGLACTIN 910), SYNTHETIC ABSORBABLE SUTURE: Brand: COATED VICRYL

## (undated) DEVICE — DRSNG TELFA PAD NONADH STR 1S 3X8IN

## (undated) DEVICE — STERILE LATEX POWDER FREE SURGICAL GLOVES WITH HYDROGEL COATING: Brand: PROTEXIS

## (undated) DEVICE — DRSNG SURESITE WNDW 4X4.5

## (undated) DEVICE — 3M™ STERI-STRIP™ REINFORCED ADHESIVE SKIN CLOSURES, R1547, 1/2 IN X 4 IN (12 MM X 100 MM), 6 STRIPS/ENVELOPE: Brand: 3M™ STERI-STRIP™

## (undated) DEVICE — AIRWY 90MM NO9

## (undated) DEVICE — KITTNER SPONGE: Brand: DEROYAL

## (undated) DEVICE — ELECTRD BLD 1P STD SS 3/32X2.44IN

## (undated) DEVICE — ADHS SKIN DERMABOND TOP ADVANCED

## (undated) DEVICE — CAUTERY TIP POLISHER: Brand: DEVON

## (undated) DEVICE — MEDI-VAC YANKAUER SUCTION HANDLE W/BULBOUS TIP: Brand: CARDINAL HEALTH

## (undated) DEVICE — GOWN,NON-REINFORCED,SIRUS,SET IN SLV,XL: Brand: MEDLINE

## (undated) DEVICE — MEDI-VAC NON-CONDUCTIVE SUCTION TUBING: Brand: CARDINAL HEALTH

## (undated) DEVICE — MONOFILAMENT POLYBUTESTER: Brand: NOVAFIL

## (undated) DEVICE — PK MINOR SPLT 10